# Patient Record
Sex: MALE | Race: WHITE | NOT HISPANIC OR LATINO | Employment: OTHER | URBAN - METROPOLITAN AREA
[De-identification: names, ages, dates, MRNs, and addresses within clinical notes are randomized per-mention and may not be internally consistent; named-entity substitution may affect disease eponyms.]

---

## 2019-05-27 PROBLEM — M17.12 OSTEOARTHRITIS OF LEFT KNEE: Status: ACTIVE | Noted: 2019-05-27

## 2019-06-06 PROBLEM — E11.9 TYPE 2 DIABETES MELLITUS, WITHOUT LONG-TERM CURRENT USE OF INSULIN: Status: ACTIVE | Noted: 2019-06-06

## 2019-06-06 PROBLEM — M17.12 PRIMARY OSTEOARTHRITIS OF LEFT KNEE: Status: ACTIVE | Noted: 2019-06-06

## 2019-06-06 PROBLEM — I10 HYPERTENSION: Status: ACTIVE | Noted: 2019-06-06

## 2019-10-07 ENCOUNTER — OFFICE VISIT (OUTPATIENT)
Dept: NEUROLOGY | Facility: CLINIC | Age: 84
End: 2019-10-07
Payer: MEDICARE

## 2019-10-07 ENCOUNTER — LAB VISIT (OUTPATIENT)
Dept: LAB | Facility: HOSPITAL | Age: 84
End: 2019-10-07
Attending: PSYCHIATRY & NEUROLOGY
Payer: MEDICARE

## 2019-10-07 VITALS
BODY MASS INDEX: 29.54 KG/M2 | DIASTOLIC BLOOD PRESSURE: 70 MMHG | WEIGHT: 194.88 LBS | SYSTOLIC BLOOD PRESSURE: 128 MMHG | HEIGHT: 68 IN | HEART RATE: 100 BPM | RESPIRATION RATE: 14 BRPM

## 2019-10-07 DIAGNOSIS — R20.0 NUMBNESS AND TINGLING OF BOTH FEET: ICD-10-CM

## 2019-10-07 DIAGNOSIS — R20.2 NUMBNESS AND TINGLING OF BOTH FEET: ICD-10-CM

## 2019-10-07 DIAGNOSIS — R20.0 NUMBNESS AND TINGLING OF BOTH FEET: Primary | ICD-10-CM

## 2019-10-07 DIAGNOSIS — R20.2 NUMBNESS AND TINGLING OF BOTH FEET: Primary | ICD-10-CM

## 2019-10-07 LAB
TSH SERPL DL<=0.005 MIU/L-ACNC: 2.32 UIU/ML (ref 0.4–4)
VIT B12 SERPL-MCNC: 278 PG/ML (ref 210–950)

## 2019-10-07 PROCEDURE — 84165 PATHOLOGIST INTERPRETATION SPE: ICD-10-PCS | Mod: 26,,, | Performed by: PATHOLOGY

## 2019-10-07 PROCEDURE — 84165 PROTEIN E-PHORESIS SERUM: CPT

## 2019-10-07 PROCEDURE — 84165 PROTEIN E-PHORESIS SERUM: CPT | Mod: 26,,, | Performed by: PATHOLOGY

## 2019-10-07 PROCEDURE — 99204 OFFICE O/P NEW MOD 45 MIN: CPT | Mod: S$GLB,,, | Performed by: PSYCHIATRY & NEUROLOGY

## 2019-10-07 PROCEDURE — 99204 PR OFFICE/OUTPT VISIT, NEW, LEVL IV, 45-59 MIN: ICD-10-PCS | Mod: S$GLB,,, | Performed by: PSYCHIATRY & NEUROLOGY

## 2019-10-07 PROCEDURE — 99999 PR PBB SHADOW E&M-NEW PATIENT-LVL III: ICD-10-PCS | Mod: PBBFAC,,, | Performed by: PSYCHIATRY & NEUROLOGY

## 2019-10-07 PROCEDURE — 86334 IMMUNOFIX E-PHORESIS SERUM: CPT

## 2019-10-07 PROCEDURE — 84443 ASSAY THYROID STIM HORMONE: CPT

## 2019-10-07 PROCEDURE — 86334 IMMUNOFIX E-PHORESIS SERUM: CPT | Mod: 26,,, | Performed by: PATHOLOGY

## 2019-10-07 PROCEDURE — 86334 PATHOLOGIST INTERPRETATION IFE: ICD-10-PCS | Mod: 26,,, | Performed by: PATHOLOGY

## 2019-10-07 PROCEDURE — 99999 PR PBB SHADOW E&M-NEW PATIENT-LVL III: CPT | Mod: PBBFAC,,, | Performed by: PSYCHIATRY & NEUROLOGY

## 2019-10-07 PROCEDURE — 36415 COLL VENOUS BLD VENIPUNCTURE: CPT

## 2019-10-07 PROCEDURE — 82607 VITAMIN B-12: CPT

## 2019-10-07 RX ORDER — IRBESARTAN 300 MG/1
300 TABLET ORAL DAILY
Refills: 11 | COMMUNITY
Start: 2019-09-08 | End: 2020-08-25

## 2019-10-07 RX ORDER — GABAPENTIN 400 MG/1
400 CAPSULE ORAL 3 TIMES DAILY
Qty: 90 CAPSULE | Refills: 11 | Status: SHIPPED | OUTPATIENT
Start: 2019-10-07 | End: 2020-08-25 | Stop reason: SDUPTHER

## 2019-10-07 RX ORDER — HYDROCHLOROTHIAZIDE 25 MG/1
25 TABLET ORAL DAILY
Refills: 11 | COMMUNITY
Start: 2019-08-27

## 2019-10-07 RX ORDER — GABAPENTIN 300 MG/1
300 CAPSULE ORAL 3 TIMES DAILY
Refills: 3 | COMMUNITY
Start: 2019-09-23 | End: 2019-10-07

## 2019-10-07 RX ORDER — GABAPENTIN 100 MG/1
CAPSULE ORAL
Refills: 0 | COMMUNITY
Start: 2019-07-02 | End: 2019-10-07 | Stop reason: DRUGHIGH

## 2019-10-07 NOTE — PROGRESS NOTES
Consult from ANNA MARIE Rooney    HPI: Hima Leija is a 86 y.o. male with history of numbness in the legs for several years.     He is diabetic (diagnosed several years ago and was not on medication for sometime)and has been having numbness and burning pain in the feet. He feels this is uncomfortable, especially in the am   PCP placed him on Gabapentin 300mg TID in the past month and this helped at first, but is not consistently helping how.  No side effect  He does feel some distal weakness.  His glucose has improving over time.   He has a prior history of lower back surgery and has some chronic non-radicular back pain.     He is very active with yard work    He lives with family. 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 He is a retired fisherman    Review of Systems   Constitutional: Negative for fever.   HENT: Negative for nosebleeds.    Eyes: Negative for double vision.   Respiratory: Negative for hemoptysis.    Cardiovascular: Negative for leg swelling.   Gastrointestinal: Negative for blood in stool.   Genitourinary: Negative for hematuria.   Musculoskeletal: Negative for falls.   Skin: Negative for rash.   Neurological: Positive for sensory change.   Endo/Heme/Allergies: Negative for environmental allergies.         I have reviewed all of this patient's past medical and surgical histories as well as family and social histories and active allergies and medications as documented in the electronic medical record.        Exam:  Gen Appearance, well developed/nourished in no apparent distress  CV: 2+ distal pulses with no edema or swelling  Neuro:  MS: Awake, alert, oriented to place, person, time, situation. Sustains attention. Recent/remote memory intact, Language is full to spontaneous speech/repetition/naming/comprehension. Fund of Knowledge is full  CN: Optic discs are flat with normal vasculature, PERRL, Extraoccular movements and visual fields are full. Normal facial sensation and strength, Hearing symmetric,  Tongue and Palate are midline and strong. Shoulder Shrug symmetric and strong.  Motor: Normal bulk, tone, no abnormal movements. 5/5 strength bilateral upper/lower extremities with 2+ reflexes in the legs and 1+ in the arms and bilateral plantar response  Sensory: symmetric to light touch, pain, temp, and vibration- but reduced in the feet to vibration and pin.  Romberg negative  Cerebellar: Finger-nose,Heal-shin, Rapid alternating movements intact  Gait: Normal stance, no ataxia but mildly bent posture noted (history of remote lumbar surgery)      Labs: 6/2019 A1C 8, CMP, CBC reviewed      Assessment/Plan: Hima Leija is a 86 y.o. male with DM2 and several years of neuropathic pain in the feet suggestive of Neuropathy  I recommend:   1. Note his DM2, poorly controlled previously now improving per him. The importance of good DM control and checking feet daily for sores reviewed  2. EMG/NCS of the legs to confirm neuropathy  3. Labs: B12, TSh, SPEP, BHAVIN  4. Gabapentin has been starting to help: raise dose to 400mg TID Unless sedation, mood changes or other side effects    RTC EMG/NCS  CC: ANNA MARIE Rooney

## 2019-10-07 NOTE — LETTER
October 7, 2019      LONI Wilson  [address]  [address]  [place]  [place] [address]           [facility]  [address] [facility] [address]  Phone: [phone]  Fax: [phone]          Patient: Hima Leija   MR Number: [id number]   YOB: 1933   Date of Visit: 10/7/2019       Dear Darci Rooney:    Thank you for referring Hima Leija to me for evaluation. Attached you will find relevant portions of my assessment and plan of care.    If you have questions, please do not hesitate to call me. I look forward to following Hima Leija along with you.    Sincerely,    Momo Rodgers MD    Enclosure  CC:  No Recipients    If you would like to receive this communication electronically, please contact [email or web address] or [phone] to request more information on [employer] access.    For providers and/or their staff who would like to refer a patient to [facility], please contact us through our one-stop-shop provider referral line, [facility], at [phone].    If you feel you have received this communication in error or would no longer like to receive these types of communications, please e-mail [email or web address]

## 2019-10-08 LAB
ALBUMIN SERPL ELPH-MCNC: 3.85 G/DL (ref 3.35–5.55)
ALPHA1 GLOB SERPL ELPH-MCNC: 0.39 G/DL (ref 0.17–0.41)
ALPHA2 GLOB SERPL ELPH-MCNC: 1.03 G/DL (ref 0.43–0.99)
B-GLOBULIN SERPL ELPH-MCNC: 0.82 G/DL (ref 0.5–1.1)
GAMMA GLOB SERPL ELPH-MCNC: 1.2 G/DL (ref 0.67–1.58)
INTERPRETATION SERPL IFE-IMP: NORMAL
PATHOLOGIST INTERPRETATION IFE: NORMAL
PATHOLOGIST INTERPRETATION SPE: NORMAL
PROT SERPL-MCNC: 7.3 G/DL (ref 6–8.4)

## 2019-10-11 ENCOUNTER — HOSPITAL ENCOUNTER (EMERGENCY)
Facility: HOSPITAL | Age: 84
Discharge: HOME | End: 2019-10-11
Attending: SURGERY
Payer: MEDICARE

## 2019-10-11 VITALS
BODY MASS INDEX: 30.03 KG/M2 | TEMPERATURE: 97 F | WEIGHT: 197.56 LBS | DIASTOLIC BLOOD PRESSURE: 87 MMHG | RESPIRATION RATE: 20 BRPM | SYSTOLIC BLOOD PRESSURE: 176 MMHG | HEART RATE: 94 BPM

## 2019-10-11 DIAGNOSIS — M25.531 BILATERAL WRIST PAIN: Primary | ICD-10-CM

## 2019-10-11 DIAGNOSIS — M11.839 CALCIUM PYROPHOSPHATE DEPOSITION DISEASE OF WRIST: ICD-10-CM

## 2019-10-11 DIAGNOSIS — M25.532 BILATERAL WRIST PAIN: Primary | ICD-10-CM

## 2019-10-11 LAB
ALBUMIN SERPL BCP-MCNC: 3.3 G/DL (ref 3.5–5.2)
ALP SERPL-CCNC: 94 U/L (ref 55–135)
ALT SERPL W/O P-5'-P-CCNC: 8 U/L (ref 10–44)
ANION GAP SERPL CALC-SCNC: 12 MMOL/L (ref 8–16)
AST SERPL-CCNC: 10 U/L (ref 10–40)
BASOPHILS # BLD AUTO: 0.02 K/UL (ref 0–0.2)
BASOPHILS NFR BLD: 0.4 % (ref 0–1.9)
BILIRUB SERPL-MCNC: 0.7 MG/DL (ref 0.1–1)
BUN SERPL-MCNC: 19 MG/DL (ref 8–23)
CALCIUM SERPL-MCNC: 9.1 MG/DL (ref 8.7–10.5)
CHLORIDE SERPL-SCNC: 101 MMOL/L (ref 95–110)
CO2 SERPL-SCNC: 24 MMOL/L (ref 23–29)
CREAT SERPL-MCNC: 0.9 MG/DL (ref 0.5–1.4)
DIFFERENTIAL METHOD: ABNORMAL
EOSINOPHIL # BLD AUTO: 0.1 K/UL (ref 0–0.5)
EOSINOPHIL NFR BLD: 1.1 % (ref 0–8)
ERYTHROCYTE [DISTWIDTH] IN BLOOD BY AUTOMATED COUNT: 13.2 % (ref 11.5–14.5)
EST. GFR  (AFRICAN AMERICAN): >60 ML/MIN/1.73 M^2
EST. GFR  (NON AFRICAN AMERICAN): >60 ML/MIN/1.73 M^2
GLUCOSE SERPL-MCNC: 153 MG/DL (ref 70–110)
HCT VFR BLD AUTO: 36 % (ref 40–54)
HGB BLD-MCNC: 12.1 G/DL (ref 14–18)
IMM GRANULOCYTES # BLD AUTO: 0.02 K/UL (ref 0–0.04)
IMM GRANULOCYTES NFR BLD AUTO: 0.4 % (ref 0–0.5)
LYMPHOCYTES # BLD AUTO: 1.3 K/UL (ref 1–4.8)
LYMPHOCYTES NFR BLD: 24.6 % (ref 18–48)
MCH RBC QN AUTO: 30.4 PG (ref 27–31)
MCHC RBC AUTO-ENTMCNC: 33.6 G/DL (ref 32–36)
MCV RBC AUTO: 91 FL (ref 82–98)
MONOCYTES # BLD AUTO: 0.5 K/UL (ref 0.3–1)
MONOCYTES NFR BLD: 8.9 % (ref 4–15)
NEUTROPHILS # BLD AUTO: 3.5 K/UL (ref 1.8–7.7)
NEUTROPHILS NFR BLD: 64.6 % (ref 38–73)
NRBC BLD-RTO: 0 /100 WBC
PLATELET # BLD AUTO: 215 K/UL (ref 150–350)
PMV BLD AUTO: 8.8 FL (ref 9.2–12.9)
POTASSIUM SERPL-SCNC: 3.9 MMOL/L (ref 3.5–5.1)
PROT SERPL-MCNC: 7.2 G/DL (ref 6–8.4)
RBC # BLD AUTO: 3.98 M/UL (ref 4.6–6.2)
SODIUM SERPL-SCNC: 137 MMOL/L (ref 136–145)
URATE SERPL-MCNC: 3.6 MG/DL (ref 3.4–7)
WBC # BLD AUTO: 5.4 K/UL (ref 3.9–12.7)

## 2019-10-11 PROCEDURE — 99284 EMERGENCY DEPT VISIT MOD MDM: CPT | Mod: 25

## 2019-10-11 PROCEDURE — 63600175 PHARM REV CODE 636 W HCPCS: Performed by: SURGERY

## 2019-10-11 PROCEDURE — 80053 COMPREHEN METABOLIC PANEL: CPT

## 2019-10-11 PROCEDURE — 36415 COLL VENOUS BLD VENIPUNCTURE: CPT

## 2019-10-11 PROCEDURE — 25000003 PHARM REV CODE 250: Performed by: NURSE PRACTITIONER

## 2019-10-11 PROCEDURE — 85025 COMPLETE CBC W/AUTO DIFF WBC: CPT

## 2019-10-11 PROCEDURE — 84550 ASSAY OF BLOOD/URIC ACID: CPT

## 2019-10-11 PROCEDURE — 96372 THER/PROPH/DIAG INJ SC/IM: CPT

## 2019-10-11 RX ORDER — MELOXICAM 7.5 MG/1
7.5 TABLET ORAL DAILY
Qty: 7 TABLET | Refills: 0 | Status: SHIPPED | OUTPATIENT
Start: 2019-10-11 | End: 2019-10-18

## 2019-10-11 RX ORDER — CYCLOBENZAPRINE HCL 5 MG
5 TABLET ORAL NIGHTLY PRN
Qty: 10 TABLET | Refills: 0 | Status: SHIPPED | OUTPATIENT
Start: 2019-10-11 | End: 2019-10-21

## 2019-10-11 RX ORDER — MORPHINE SULFATE 2 MG/ML
1 INJECTION, SOLUTION INTRAMUSCULAR; INTRAVENOUS
Status: COMPLETED | OUTPATIENT
Start: 2019-10-11 | End: 2019-10-11

## 2019-10-11 RX ORDER — TRAMADOL HYDROCHLORIDE 50 MG/1
50 TABLET ORAL
Status: COMPLETED | OUTPATIENT
Start: 2019-10-11 | End: 2019-10-11

## 2019-10-11 RX ORDER — ONDANSETRON 2 MG/ML
4 INJECTION INTRAMUSCULAR; INTRAVENOUS
Status: COMPLETED | OUTPATIENT
Start: 2019-10-11 | End: 2019-10-11

## 2019-10-11 RX ADMIN — TRAMADOL HYDROCHLORIDE 50 MG: 50 TABLET, FILM COATED ORAL at 06:10

## 2019-10-11 RX ADMIN — MORPHINE SULFATE 1 MG: 2 INJECTION, SOLUTION INTRAMUSCULAR; INTRAVENOUS at 06:10

## 2019-10-11 RX ADMIN — ONDANSETRON 4 MG: 2 INJECTION INTRAMUSCULAR; INTRAVENOUS at 06:10

## 2019-10-11 NOTE — ED PROVIDER NOTES
Encounter Date: 10/11/2019       History     Chief Complaint   Patient presents with    Hand Pain     bilateral hands     Hima Leija is a 86 y.o. male with PMH of CAD, hyperlipidemia, hypertension, PAD, osteoarthritis, DM type 2 who presents to the ED with reports of bilateral wrist pain with associated swelling. Patient reports a 3 day history of acute onset of pain to bilateral wrist.  Pain is described as throbbing, currently rated 5/10 on pain scale.  He reports associated swelling.  He reports decreased range of motion to bilateral hands, difficulty grasping.  He does not endorse fever, chills, or body aches. He denies trauma or history of gout.     The history is provided by the patient.     Review of patient's allergies indicates:  No Known Allergies  Past Medical History:   Diagnosis Date    Bleeding from the nose     R/T ASPIRIN USE    Coronary artery disease     Diabetes mellitus     [place] (hard of hearing)     Hyperlipidemia     Hypertension     Osteoarthritis     PAD (peripheral artery disease)     Wears dentures     UPPER      Past Surgical History:   Procedure Laterality Date    BACK SURGERY      CATARACT EXTRACTION, BILATERAL      COLONOSCOPY      CORONARY ARTERY BYPASS GRAFT      JOINT REPLACEMENT Left 2019    knee    TOTAL KNEE ARTHROPLASTY Left 2019    Procedure: ARTHROPLASTY, KNEE, TOTAL, left;  Surgeon: ISABEL Gaitan II, MD;  Location: [facility] OR;  Service: Orthopedics;  Laterality: Left;     Family History   Problem Relation Age of Onset    Hypertension Mother     Heart disease Mother     Diabetes Mother      Social History     Tobacco Use    Smoking status: Former Smoker     Types: Cigarettes     Last attempt to quit: 1966     Years since quittin.8    Smokeless tobacco: Never Used   Substance Use Topics    Alcohol use: Yes     Alcohol/week: 1.0 standard drinks     Types: 1 Glasses of wine per week     Comment: WEEKLY    Drug use: Never     Review of  Systems   Constitutional: Negative.  Negative for appetite change, chills and fever.   HENT: Negative.  Negative for congestion, ear discharge, ear pain, postnasal drip, rhinorrhea and sore throat.    Eyes: Negative.    Respiratory: Negative.  Negative for cough, chest tightness and shortness of breath.    Cardiovascular: Negative.  Negative for chest pain.   Gastrointestinal: Negative.  Negative for abdominal distention, abdominal pain and nausea.   Endocrine: Negative.    Genitourinary: Negative.  Negative for dysuria, flank pain, hematuria and urgency.   Musculoskeletal: Positive for arthralgias (Bilateral wrists with decreased range of motion.) and joint swelling (Bilateral wrists). Negative for back pain.   Skin: Negative.  Negative for rash.   Allergic/Immunologic: Negative.    Neurological: Negative.  Negative for dizziness, weakness, numbness and headaches.   Hematological: Negative.  Does not bruise/bleed easily.   Psychiatric/Behavioral: Negative.        Physical Exam     Initial Vitals [10/11/19 1635]   BP Pulse Resp Temp SpO2   (!) 182/93 99 20 97.4 °F (36.3 °C) --      MAP       --         Physical Exam    Nursing note and vitals reviewed.  Constitutional: He appears well-developed and well-nourished.   HENT:   Head: Normocephalic and atraumatic.   Right Ear: External ear normal.   Left Ear: External ear normal.   Mouth/Throat: Oropharynx is clear and moist.   Eyes: Conjunctivae and EOM are normal. Pupils are equal, round, and reactive to light.   Neck: Neck supple.   Cardiovascular: Normal rate, regular rhythm, normal heart sounds and intact distal pulses.   Pulmonary/Chest: Effort normal and breath sounds normal.   Abdominal: Soft. Bowel sounds are normal. There is no tenderness.   Musculoskeletal:        Right wrist: He exhibits decreased range of motion, tenderness and swelling (Joint swelling with tenderness with palpation.  No erythema or warmth.  No sensory deficits noted. Right hand warm and  dry, capillary refill less than 2 sec.).        Left wrist: He exhibits decreased range of motion, tenderness and swelling (Joint swelling with tenderness with palpation.  Decreased range of motion due to pain. No sensory deficits noted. Capillary refill less than 2 sec.).   Neurological: He is alert and oriented to person, place, and time. He has normal strength and normal reflexes. He displays no atrophy and no tremor. No cranial nerve deficit or sensory deficit. He exhibits normal muscle tone. He displays no seizure activity. GCS eye subscore is 4. GCS verbal subscore is 5. GCS motor subscore is 6.   Skin: Skin is warm and dry.   Psychiatric: He has a normal mood and affect. His behavior is normal. Judgment and thought content normal.         ED Course   Procedures  Labs Reviewed   CBC W/ AUTO DIFFERENTIAL - Abnormal; Notable for the following components:       Result Value    RBC 3.98 (*)     Hemoglobin 12.1 (*)     Hematocrit 36.0 (*)     MPV 8.8 (*)     All other components within normal limits   COMPREHENSIVE METABOLIC PANEL - Abnormal; Notable for the following components:    Glucose 153 (*)     Albumin 3.3 (*)     ALT 8 (*)     All other components within normal limits   URIC ACID          Imaging Results          X-Ray Wrist Complete Right (Final result)  Result time 10/11/19 17:48:50    Final result by Anatoliy Sheehan MD (10/11/19 17:48:50)                 Impression:      No acute fracture or dislocation.      Electronically signed by: Anatoliy Sheehan MD  Date:    10/11/2019  Time:    17:48             Narrative:    EXAMINATION:  XR WRIST COMPLETE 3 VIEWS RIGHT    CLINICAL HISTORY:  XR WRIST COMPLETE 3 VIEWS RIGHTPain in right wrist    COMPARISON:  None    FINDINGS:  Three views of the right wrist were obtained.    No evidence of acute fracture or dislocation.  Diffuse osteopenia and moderate soft tissue swelling.  Triangular fibrocartilage calcification can be seen with CPPD.  Osteoarthritic changes are  seen at the 1st carpometacarpal joint and radiocarpal joint which is moderate.                               X-Ray Wrist Complete Left (Final result)  Result time 10/11/19 17:47:43    Final result by Anatoliy Sheehan MD (10/11/19 17:47:43)                 Impression:      Question nondisplaced fracture involving the proximal 3rd of the scaphoid bone. Correlate with point tenderness within the anatomic snuffbox.      Electronically signed by: Anatoliy Sheehan MD  Date:    10/11/2019  Time:    17:47             Narrative:    EXAMINATION:  XR WRIST COMPLETE 3 VIEWS LEFT    CLINICAL HISTORY:  XR WRIST COMPLETE 3 VIEWS LEFTPain in left wrist    COMPARISON:  None    FINDINGS:  Three views of the left wrist were obtained.    Question nondisplaced fracture involving the proximal 3rd of the scaphoid bone.  Correlate with point tenderness within the anatomic snuffbox..  Mild osteopenia.  Diffuse soft tissue swelling and vascular calcifications.  Chondrocalcinosis is seen within the triangular fibrocartilage which can be seen with CPPD.  Moderate degenerative changes are seen at the radiocarpal and 1st carpometacarpal joint.                                  Medications   traMADol tablet 50 mg (50 mg Oral Given 10/11/19 1805)   morphine injection 1 mg (1 mg Intramuscular Given 10/11/19 1837)   ondansetron injection 4 mg (4 mg Intramuscular Given 10/11/19 1837)                          Clinical Impression:       ICD-10-CM ICD-9-CM   1. Bilateral wrist pain M25.531 719.43    M25.532    2. 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    MELOXICAM (MOBIC) 7.5 MG TABLET    Take 1 tablet (7.5 mg total) by mouth once daily. for 7 days      The patient acknowledges that close follow up with medical  provider is required. Instructed to follow up with PCP within 2 days. Patient was given specific return precautions. The patient agrees to comply with all instruction and directions given in the ER.                  Princess Vogel NP  10/11/19 1930

## 2019-10-11 NOTE — ED TRIAGE NOTES
86 y.o. male presents to ER   Chief Complaint   Patient presents with    Hand Pain     bilateral hands   pt c/o bilateral hand swelling, pain, & itching x's 3 days, reports hx of gout. No acute distress noted. No acute distress noted.

## 2019-10-11 NOTE — ED NOTES
The patient is awake, alert, family member at bedside. Normal respiratory effort and rate noted, full ROM in all extremities. Bed in low, locked position. Pt able to change position independently. Will continue to monitor.

## 2019-10-11 NOTE — ED NOTES
The patient is awake, alert, aware of environment. Airway is open and patent, respirations are spontaneous, normal respiratory effort and rate noted, full ROM in all extremities, resting comfortably. No change from previous assessment. Bed in low, locked position. Will continue to monitor.

## 2019-10-31 ENCOUNTER — PROCEDURE VISIT (OUTPATIENT)
Dept: NEUROLOGY | Facility: CLINIC | Age: 84
End: 2019-10-31
Payer: MEDICARE

## 2019-10-31 DIAGNOSIS — R20.0 NUMBNESS AND TINGLING OF BOTH FEET: ICD-10-CM

## 2019-10-31 DIAGNOSIS — R20.2 NUMBNESS AND TINGLING OF BOTH FEET: ICD-10-CM

## 2019-10-31 DIAGNOSIS — E11.42 DIABETIC POLYNEUROPATHY ASSOCIATED WITH TYPE 2 DIABETES MELLITUS: Primary | ICD-10-CM

## 2019-10-31 PROCEDURE — 95886 MUSC TEST DONE W/N TEST COMP: CPT | Mod: S$GLB,,, | Performed by: PSYCHIATRY & NEUROLOGY

## 2019-10-31 PROCEDURE — 95910 NRV CNDJ TEST 7-8 STUDIES: CPT | Mod: S$GLB,,, | Performed by: PSYCHIATRY & NEUROLOGY

## 2019-10-31 PROCEDURE — 95886 PR EMG COMPLETE, W/ NERVE CONDUCTION STUDIES, 5+ MUSCLES: ICD-10-PCS | Mod: S$GLB,,, | Performed by: PSYCHIATRY & NEUROLOGY

## 2019-10-31 PROCEDURE — 95910 PR NERVE CONDUCTION STUDY; 7-8 STUDIES: ICD-10-PCS | Mod: S$GLB,,, | Performed by: PSYCHIATRY & NEUROLOGY

## 2019-10-31 NOTE — PROCEDURES
EMG W/ ULTRASOUND AND NERVE CONDUCTION TEST 2 Extremities  Date/Time: 10/31/2019 3:00 PM  Performed by: Momo Rodgers MD  Authorized by: Momo Rodgers MD       REPORT OF EMG and NERVE CONDUCTION STUDY    Name: Hima Leija  Date of Study:  10/31/19  Referring Physician:  Vishal  Test Performed by:  MD Vishal  Full Values Attached  Informed Consent Scanned.   No anesthesia used.   Amount of Blood Loss: none. The patient tolerated this procedure well.       Informed consent was obtained prior to performing this study. Two patient identifiers were confirmed with the patient prior to performing this study. A time out to determine correct patient and and agreement on procedure performed was conducted prior to the concentric needle examination.    Reason for the study:  Numb feet      Findings:   Nerve conduction studies of the bilateral peroneal motor, bilateral tibial motor, bilateral sural nerves and bilateral H-reflexes were performed.   Amplitudes were mildly reduced without conduction block. However, distal latencies, conduction velocities, and F-waves were normal. H reflexes were absent  EMG of selected muscles of the bilateral legs  were performed as indicated on the attached sheets.  Insertional activity was normal without fasciculation or fibrillation, normal sized and phasia of motor units.      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 [facility].     Recommendations (discusses at this visit):  -B12 low normal reviewed. Take 1000mcg B12 nightly  -Gabapentin 400mg TID is helpful. Maintain good DM2 control to prevent worsening neuropathy reviewed.     RTC 6 months

## 2020-08-25 ENCOUNTER — OFFICE VISIT (OUTPATIENT)
Dept: NEUROLOGY | Facility: CLINIC | Age: 85
End: 2020-08-25
Payer: MEDICARE

## 2020-08-25 VITALS
DIASTOLIC BLOOD PRESSURE: 78 MMHG | WEIGHT: 198.44 LBS | BODY MASS INDEX: 30.07 KG/M2 | HEIGHT: 68 IN | OXYGEN SATURATION: 98 % | HEART RATE: 75 BPM | RESPIRATION RATE: 16 BRPM | TEMPERATURE: 98 F | SYSTOLIC BLOOD PRESSURE: 110 MMHG

## 2020-08-25 DIAGNOSIS — R20.2 NUMBNESS AND TINGLING OF BOTH FEET: Primary | ICD-10-CM

## 2020-08-25 DIAGNOSIS — E66.9 OBESITY (BMI 30.0-34.9): ICD-10-CM

## 2020-08-25 DIAGNOSIS — E11.42 DIABETIC POLYNEUROPATHY ASSOCIATED WITH TYPE 2 DIABETES MELLITUS: ICD-10-CM

## 2020-08-25 DIAGNOSIS — R20.0 NUMBNESS AND TINGLING OF BOTH FEET: Primary | ICD-10-CM

## 2020-08-25 PROCEDURE — 99214 PR OFFICE/OUTPT VISIT, EST, LEVL IV, 30-39 MIN: ICD-10-PCS | Mod: S$GLB,,, | Performed by: PSYCHIATRY & NEUROLOGY

## 2020-08-25 PROCEDURE — 99999 PR PBB SHADOW E&M-EST. PATIENT-LVL IV: ICD-10-PCS | Mod: PBBFAC,,, | Performed by: PSYCHIATRY & NEUROLOGY

## 2020-08-25 PROCEDURE — 1101F PT FALLS ASSESS-DOCD LE1/YR: CPT | Mod: CPTII,S$GLB,, | Performed by: PSYCHIATRY & NEUROLOGY

## 2020-08-25 PROCEDURE — 1125F AMNT PAIN NOTED PAIN PRSNT: CPT | Mod: S$GLB,,, | Performed by: PSYCHIATRY & NEUROLOGY

## 2020-08-25 PROCEDURE — 1125F PR PAIN SEVERITY QUANTIFIED, PAIN PRESENT: ICD-10-PCS | Mod: S$GLB,,, | Performed by: PSYCHIATRY & NEUROLOGY

## 2020-08-25 PROCEDURE — 1101F PR PT FALLS ASSESS DOC 0-1 FALLS W/OUT INJ PAST YR: ICD-10-PCS | Mod: CPTII,S$GLB,, | Performed by: PSYCHIATRY & NEUROLOGY

## 2020-08-25 PROCEDURE — 99214 OFFICE O/P EST MOD 30 MIN: CPT | Mod: S$GLB,,, | Performed by: PSYCHIATRY & NEUROLOGY

## 2020-08-25 PROCEDURE — 1159F MED LIST DOCD IN RCRD: CPT | Mod: S$GLB,,, | Performed by: PSYCHIATRY & NEUROLOGY

## 2020-08-25 PROCEDURE — 1159F PR MEDICATION LIST DOCUMENTED IN MEDICAL RECORD: ICD-10-PCS | Mod: S$GLB,,, | Performed by: PSYCHIATRY & NEUROLOGY

## 2020-08-25 PROCEDURE — 99999 PR PBB SHADOW E&M-EST. PATIENT-LVL IV: CPT | Mod: PBBFAC,,, | Performed by: PSYCHIATRY & NEUROLOGY

## 2020-08-25 RX ORDER — LANOLIN ALCOHOL/MO/W.PET/CERES
1000 CREAM (GRAM) TOPICAL DAILY
COMMUNITY

## 2020-08-25 RX ORDER — MELOXICAM 7.5 MG/1
7.5 TABLET ORAL 2 TIMES DAILY PRN
COMMUNITY
Start: 2020-08-10 | End: 2022-08-23

## 2020-08-25 RX ORDER — GABAPENTIN 400 MG/1
400 CAPSULE ORAL 3 TIMES DAILY
Qty: 270 CAPSULE | Refills: 3 | Status: SHIPPED | OUTPATIENT
Start: 2020-08-25 | End: 2021-09-22

## 2020-08-25 NOTE — PROGRESS NOTES
Consult from ANNA MARIE Rooney    HPI: Hima Leija is a 87 y.o. male with history of numbness in the legs for several years.     Patient is here for routine follow up    Still taking B12   Neuropathy pain is currently well controlled, tolerable burning at times.    A1C is pending with PCP. Reports good control of DM2 overall since last visit.   Weight up a few pounds since the last visit    He lives with family. 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 He is a retired fisherman    Review of Systems   Constitutional: Negative for fever.   HENT: Negative for nosebleeds.    Eyes: Negative for double vision.   Respiratory: Negative for hemoptysis.    Cardiovascular: Negative for leg swelling.   Gastrointestinal: Negative for blood in stool.   Genitourinary: Negative for hematuria.   Musculoskeletal: Negative for falls.   Skin: Negative for rash.   Neurological: Positive for sensory change.   Endo/Heme/Allergies: Negative for environmental allergies.   Psychiatric/Behavioral: Negative for memory loss.         I have reviewed all of this patient's past medical and surgical histories as well as family and social histories and active allergies and medications as documented in the electronic medical record.        Exam:  Gen Appearance, well developed/nourished in no apparent distress  CV: 2+ distal pulses with no edema or swelling  Neuro:  MS: Awake, alert, . Sustains attention. Recent/remote memory intact, Language is full to spontaneous speech/repetition/naming/comprehension. Fund of Knowledge is full  CN: Optic discs are flat with normal vasculature, PERRL, Extraoccular movements and visual fields are full. Normal facial sensation and strength, Hearing symmetric, Tongue and Palate are midline and strong. Shoulder Shrug symmetric and strong.  Motor: Normal bulk, tone, no abnormal movements. 5/5 strength bilateral upper/lower extremities with 2+ reflexes in the legs and 1+ in the arms and no clonus  Sensory: symmetric to light  touch, pain, temp, and vibration- but reduced in the feet to vibration and pin.  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 male with DM2 and several years of neuropathic pain in the feet suggestive of Neuropathy. 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 Note his DM2, poorly controlled previously now improving per him. The importance of good DM control and checking feet daily for sores reviewed prior. Goal A1C less than 7  -B12 low normal.  Taking 1000mcg B12 nightly- follow up B12 level at the next visit  -Gabapentin 400mg TID is helpful- continue  -reduced obesity through diet, exercise as tolerated reviewed  RTC 1 year

## 2022-02-14 ENCOUNTER — LAB VISIT (OUTPATIENT)
Dept: LAB | Facility: HOSPITAL | Age: 87
End: 2022-02-14
Attending: PSYCHIATRY & NEUROLOGY
Payer: MEDICARE

## 2022-02-14 ENCOUNTER — OFFICE VISIT (OUTPATIENT)
Dept: NEUROLOGY | Facility: CLINIC | Age: 87
End: 2022-02-14
Payer: MEDICARE

## 2022-02-14 VITALS
HEART RATE: 80 BPM | BODY MASS INDEX: 28.53 KG/M2 | HEIGHT: 68 IN | SYSTOLIC BLOOD PRESSURE: 144 MMHG | WEIGHT: 188.25 LBS | RESPIRATION RATE: 14 BRPM | DIASTOLIC BLOOD PRESSURE: 76 MMHG

## 2022-02-14 DIAGNOSIS — E53.8 B12 DEFICIENCY: ICD-10-CM

## 2022-02-14 DIAGNOSIS — E66.3 OVERWEIGHT: ICD-10-CM

## 2022-02-14 DIAGNOSIS — E11.42 DIABETIC POLYNEUROPATHY ASSOCIATED WITH TYPE 2 DIABETES MELLITUS: Primary | ICD-10-CM

## 2022-02-14 LAB — VIT B12 SERPL-MCNC: 339 PG/ML (ref 210–950)

## 2022-02-14 PROCEDURE — 99214 PR OFFICE/OUTPT VISIT, EST, LEVL IV, 30-39 MIN: ICD-10-PCS | Mod: S$GLB,,, | Performed by: PSYCHIATRY & NEUROLOGY

## 2022-02-14 PROCEDURE — 1159F MED LIST DOCD IN RCRD: CPT | Mod: CPTII,S$GLB,, | Performed by: PSYCHIATRY & NEUROLOGY

## 2022-02-14 PROCEDURE — 82607 VITAMIN B-12: CPT | Performed by: PSYCHIATRY & NEUROLOGY

## 2022-02-14 PROCEDURE — 99214 OFFICE O/P EST MOD 30 MIN: CPT | Mod: S$GLB,,, | Performed by: PSYCHIATRY & NEUROLOGY

## 2022-02-14 PROCEDURE — 1159F PR MEDICATION LIST DOCUMENTED IN MEDICAL RECORD: ICD-10-PCS | Mod: CPTII,S$GLB,, | Performed by: PSYCHIATRY & NEUROLOGY

## 2022-02-14 PROCEDURE — 99999 PR PBB SHADOW E&M-EST. PATIENT-LVL III: ICD-10-PCS | Mod: PBBFAC,,, | Performed by: PSYCHIATRY & NEUROLOGY

## 2022-02-14 PROCEDURE — 99999 PR PBB SHADOW E&M-EST. PATIENT-LVL III: CPT | Mod: PBBFAC,,, | Performed by: PSYCHIATRY & NEUROLOGY

## 2022-02-14 PROCEDURE — 1126F AMNT PAIN NOTED NONE PRSNT: CPT | Mod: CPTII,S$GLB,, | Performed by: PSYCHIATRY & NEUROLOGY

## 2022-02-14 PROCEDURE — 36415 COLL VENOUS BLD VENIPUNCTURE: CPT | Performed by: PSYCHIATRY & NEUROLOGY

## 2022-02-14 PROCEDURE — 1160F PR REVIEW ALL MEDS BY PRESCRIBER/CLIN PHARMACIST DOCUMENTED: ICD-10-PCS | Mod: CPTII,S$GLB,, | Performed by: PSYCHIATRY & NEUROLOGY

## 2022-02-14 PROCEDURE — 1126F PR PAIN SEVERITY QUANTIFIED, NO PAIN PRESENT: ICD-10-PCS | Mod: CPTII,S$GLB,, | Performed by: PSYCHIATRY & NEUROLOGY

## 2022-02-14 PROCEDURE — 1160F RVW MEDS BY RX/DR IN RCRD: CPT | Mod: CPTII,S$GLB,, | Performed by: PSYCHIATRY & NEUROLOGY

## 2022-02-14 RX ORDER — ORAL SEMAGLUTIDE 7 MG/1
7 TABLET ORAL DAILY
COMMUNITY
Start: 2021-08-26

## 2022-02-14 RX ORDER — GABAPENTIN 400 MG/1
CAPSULE ORAL
Qty: 270 CAPSULE | Refills: 3 | Status: SHIPPED | OUTPATIENT
Start: 2022-02-14 | End: 2022-08-23 | Stop reason: SDUPTHER

## 2022-02-14 NOTE — PROGRESS NOTES
HPI: Hima Leija is a 88 y.o. male with history of numbness in the legs for several years.     Patient is here for routine follow up    He states pain is well controlled.   Some burning pain noted if he skips a dose. Takes this TID depending on symptoms and this works well.    Still taking B12     No new weakness or numbness    Reports A1C followed by PCP. He thinks this is doing well, but does take a different medication more recently which seems to hep    Weight is 10 pounds with diet      Review of Systems   Constitutional: Negative for fever.   HENT: Negative for nosebleeds.    Eyes: Negative for double vision.   Respiratory: Negative for hemoptysis.    Cardiovascular: Negative for leg swelling.   Gastrointestinal: Negative for blood in stool.   Genitourinary: Negative for hematuria.   Musculoskeletal: Negative for falls.   Skin: Negative for rash.   Neurological: Positive for sensory change.   Endo/Heme/Allergies: Negative for environmental allergies.   Psychiatric/Behavioral: Negative for memory loss.         I have reviewed all of this patient's past medical and surgical histories as well as family and social histories and active allergies and medications as documented in the electronic medical record.        Exam:  Gen Appearance, well developed/nourished in no apparent distress  CV: 2+ distal pulses with no edema or swelling  Neuro:  MS: Awake, alert, . Sustains attention. Recent/remote memory intact, Language is full to spontaneous speech/repetition/naming/comprehension. Fund of Knowledge is full  CN: Optic discs are flat with normal vasculature, PERRL, Extraoccular movements and visual fields are full. Normal facial sensation and strength, Hearing symmetric, Tongue and Palate are midline and strong. Shoulder Shrug symmetric and strong.  Motor: Normal bulk, tone, no abnormal movements. 5/5 strength bilateral upper/lower extremities with 2+ reflexes in the legs and 1+ in the arms and no  clonus  Sensory: symmetric to light touch, pain, temp, and vibration- but reduced in the feet to vibration and pin.  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 male with DM2 and several years of neuropathic pain in the feet suggestive of Neuropathy. 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 Note his DM2, poorly controlled previously now improving per him. The importance of good DM control and checking feet daily for sores reviewed prior. Goal A1C less than 7  -B12 low normal.  Taking 1000mcg B12 nightly- follow up B12 level now per orders  -Gabapentin 400mg TID is helpful- continue  -reduced obesity through diet, exercise as tolerated reviewed- no longer obese (overweight)  RTC 1 year

## 2022-08-09 ENCOUNTER — TELEPHONE (OUTPATIENT)
Dept: NEUROLOGY | Facility: CLINIC | Age: 87
End: 2022-08-09
Payer: MEDICARE

## 2022-08-09 NOTE — TELEPHONE ENCOUNTER
Notify patient about granddaughter's  call (assuming we don't have his signed permission to discuss his case with her).  If he is sleepy or wants to make a med switch, suggest a sooner appointment than previously planned to discuss

## 2022-08-09 NOTE — TELEPHONE ENCOUNTER
I spoke with patient's granddaughter.  She expressed concerns about Mr. Rabago sleeping way too much on the gabapentin. 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  He takes the gabapentin for the burning in his feet.  Please advise.

## 2022-08-09 NOTE — TELEPHONE ENCOUNTER
----- Message from Peace Oates sent at 2022  3:29 PM CDT -----  Contact: lilo/granddaughter  Hima Leija  MRN: [id number]  : 1933  PCP: Darci Rooney  Home Phone      [phone]  Work Phone      Not on file.  Mobile          [phone]  Home Phone      [phone]      MESSAGE:Have questions and concerns about NEURONTIN.      Phone [phone]

## 2022-08-23 ENCOUNTER — LAB VISIT (OUTPATIENT)
Dept: LAB | Facility: HOSPITAL | Age: 87
End: 2022-08-23
Attending: NURSE PRACTITIONER
Payer: MEDICARE

## 2022-08-23 ENCOUNTER — OFFICE VISIT (OUTPATIENT)
Dept: NEUROLOGY | Facility: CLINIC | Age: 87
End: 2022-08-23
Payer: MEDICARE

## 2022-08-23 VITALS
DIASTOLIC BLOOD PRESSURE: 70 MMHG | SYSTOLIC BLOOD PRESSURE: 130 MMHG | HEART RATE: 73 BPM | HEIGHT: 68 IN | RESPIRATION RATE: 16 BRPM | BODY MASS INDEX: 29.4 KG/M2 | WEIGHT: 194 LBS

## 2022-08-23 DIAGNOSIS — I10 HYPERTENSION, UNSPECIFIED TYPE: ICD-10-CM

## 2022-08-23 DIAGNOSIS — E53.8 B12 DEFICIENCY: ICD-10-CM

## 2022-08-23 DIAGNOSIS — G62.9 NEUROPATHY: ICD-10-CM

## 2022-08-23 DIAGNOSIS — R53.83 FATIGUE, UNSPECIFIED TYPE: ICD-10-CM

## 2022-08-23 DIAGNOSIS — E11.69 TYPE 2 DIABETES MELLITUS WITH OTHER SPECIFIED COMPLICATION, WITHOUT LONG-TERM CURRENT USE OF INSULIN: Primary | ICD-10-CM

## 2022-08-23 LAB
ALBUMIN SERPL BCP-MCNC: 4 G/DL (ref 3.5–5.2)
ALP SERPL-CCNC: 100 U/L (ref 55–135)
ALT SERPL W/O P-5'-P-CCNC: 14 U/L (ref 10–44)
ANION GAP SERPL CALC-SCNC: 10 MMOL/L (ref 8–16)
AST SERPL-CCNC: 14 U/L (ref 10–40)
BASOPHILS # BLD AUTO: 0.03 K/UL (ref 0–0.2)
BASOPHILS NFR BLD: 0.6 % (ref 0–1.9)
BILIRUB SERPL-MCNC: 0.6 MG/DL (ref 0.1–1)
BUN SERPL-MCNC: 19 MG/DL (ref 8–23)
CALCIUM SERPL-MCNC: 9.7 MG/DL (ref 8.7–10.5)
CHLORIDE SERPL-SCNC: 102 MMOL/L (ref 95–110)
CO2 SERPL-SCNC: 26 MMOL/L (ref 23–29)
CREAT SERPL-MCNC: 1.1 MG/DL (ref 0.5–1.4)
DIFFERENTIAL METHOD: ABNORMAL
EOSINOPHIL # BLD AUTO: 0.1 K/UL (ref 0–0.5)
EOSINOPHIL NFR BLD: 2.1 % (ref 0–8)
ERYTHROCYTE [DISTWIDTH] IN BLOOD BY AUTOMATED COUNT: 13.4 % (ref 11.5–14.5)
EST. GFR  (NO RACE VARIABLE): >60 ML/MIN/1.73 M^2
GLUCOSE SERPL-MCNC: 180 MG/DL (ref 70–110)
HCT VFR BLD AUTO: 40.9 % (ref 40–54)
HGB BLD-MCNC: 14.5 G/DL (ref 14–18)
IMM GRANULOCYTES # BLD AUTO: 0.04 K/UL (ref 0–0.04)
IMM GRANULOCYTES NFR BLD AUTO: 0.8 % (ref 0–0.5)
LYMPHOCYTES # BLD AUTO: 2 K/UL (ref 1–4.8)
LYMPHOCYTES NFR BLD: 37.5 % (ref 18–48)
MCH RBC QN AUTO: 33.2 PG (ref 27–31)
MCHC RBC AUTO-ENTMCNC: 35.5 G/DL (ref 32–36)
MCV RBC AUTO: 94 FL (ref 82–98)
MONOCYTES # BLD AUTO: 0.3 K/UL (ref 0.3–1)
MONOCYTES NFR BLD: 6.2 % (ref 4–15)
NEUTROPHILS # BLD AUTO: 2.8 K/UL (ref 1.8–7.7)
NEUTROPHILS NFR BLD: 52.8 % (ref 38–73)
NRBC BLD-RTO: 0 /100 WBC
PLATELET # BLD AUTO: 188 K/UL (ref 150–450)
PMV BLD AUTO: 8.7 FL (ref 9.2–12.9)
POTASSIUM SERPL-SCNC: 4.7 MMOL/L (ref 3.5–5.1)
PROT SERPL-MCNC: 7.7 G/DL (ref 6–8.4)
RBC # BLD AUTO: 4.37 M/UL (ref 4.6–6.2)
SODIUM SERPL-SCNC: 138 MMOL/L (ref 136–145)
T4 SERPL-MCNC: 7.2 UG/DL (ref 4.5–11.5)
TSH SERPL DL<=0.005 MIU/L-ACNC: 2.95 UIU/ML (ref 0.4–4)
WBC # BLD AUTO: 5.2 K/UL (ref 3.9–12.7)

## 2022-08-23 PROCEDURE — 36415 COLL VENOUS BLD VENIPUNCTURE: CPT | Performed by: NURSE PRACTITIONER

## 2022-08-23 PROCEDURE — 99214 PR OFFICE/OUTPT VISIT, EST, LEVL IV, 30-39 MIN: ICD-10-PCS | Mod: S$GLB,,, | Performed by: NURSE PRACTITIONER

## 2022-08-23 PROCEDURE — 99999 PR PBB SHADOW E&M-EST. PATIENT-LVL IV: CPT | Mod: PBBFAC,,, | Performed by: NURSE PRACTITIONER

## 2022-08-23 PROCEDURE — 84443 ASSAY THYROID STIM HORMONE: CPT | Performed by: NURSE PRACTITIONER

## 2022-08-23 PROCEDURE — 84436 ASSAY OF TOTAL THYROXINE: CPT | Performed by: NURSE PRACTITIONER

## 2022-08-23 PROCEDURE — 1159F PR MEDICATION LIST DOCUMENTED IN MEDICAL RECORD: ICD-10-PCS | Mod: CPTII,S$GLB,, | Performed by: NURSE PRACTITIONER

## 2022-08-23 PROCEDURE — 1160F RVW MEDS BY RX/DR IN RCRD: CPT | Mod: CPTII,S$GLB,, | Performed by: NURSE PRACTITIONER

## 2022-08-23 PROCEDURE — 85025 COMPLETE CBC W/AUTO DIFF WBC: CPT | Performed by: NURSE PRACTITIONER

## 2022-08-23 PROCEDURE — 1126F PR PAIN SEVERITY QUANTIFIED, NO PAIN PRESENT: ICD-10-PCS | Mod: CPTII,S$GLB,, | Performed by: NURSE PRACTITIONER

## 2022-08-23 PROCEDURE — 1126F AMNT PAIN NOTED NONE PRSNT: CPT | Mod: CPTII,S$GLB,, | Performed by: NURSE PRACTITIONER

## 2022-08-23 PROCEDURE — 99999 PR PBB SHADOW E&M-EST. PATIENT-LVL IV: ICD-10-PCS | Mod: PBBFAC,,, | Performed by: NURSE PRACTITIONER

## 2022-08-23 PROCEDURE — 80053 COMPREHEN METABOLIC PANEL: CPT | Performed by: NURSE PRACTITIONER

## 2022-08-23 PROCEDURE — 1159F MED LIST DOCD IN RCRD: CPT | Mod: CPTII,S$GLB,, | Performed by: NURSE PRACTITIONER

## 2022-08-23 PROCEDURE — 99214 OFFICE O/P EST MOD 30 MIN: CPT | Mod: S$GLB,,, | Performed by: NURSE PRACTITIONER

## 2022-08-23 PROCEDURE — 1160F PR REVIEW ALL MEDS BY PRESCRIBER/CLIN PHARMACIST DOCUMENTED: ICD-10-PCS | Mod: CPTII,S$GLB,, | Performed by: NURSE PRACTITIONER

## 2022-08-23 RX ORDER — GABAPENTIN 400 MG/1
400 CAPSULE ORAL NIGHTLY
Qty: 30 CAPSULE | Refills: 5 | Status: SHIPPED | OUTPATIENT
Start: 2022-08-23 | End: 2023-11-21 | Stop reason: SDUPTHER

## 2022-08-23 RX ORDER — GABAPENTIN 100 MG/1
200 CAPSULE ORAL 2 TIMES DAILY
Qty: 120 CAPSULE | Refills: 5 | Status: SHIPPED | OUTPATIENT
Start: 2022-08-23 | End: 2023-08-23

## 2022-08-23 RX ORDER — IRBESARTAN 300 MG/1
300 TABLET ORAL DAILY
COMMUNITY

## 2022-08-23 NOTE — PROGRESS NOTES
"HPI: Hima Leija is a 89 y.o. 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 He has DM II and HTN.     Patient is here at the request of his family, who called clinic to report daytime sleepiness. Family asked that a medication alternative to Gabapentin be considered for his neuropathy, as he drives frequently. He has been taking this dose of Gabapentin (400 mg tid) since 10/2019.     Granddaughter is here today with patient, and she feels that he has been chronically sleepy during the daytime. He naps during the day, but she is concerned, as he does not wake easily at times.     Neuropathy pain is more bothersome at night.     He is still taking B12.     No new weakness or numbness. No falls.     Reports A1C followed by PCP. He reports that this is "well managed".     Weight is 10 pounds with diet.     Review of Systems   Constitutional: Positive for malaise/fatigue. Negative for fever.   HENT: Negative for nosebleeds.    Eyes: Negative for double vision.   Respiratory: Negative for hemoptysis.    Cardiovascular: Negative for leg swelling.   Gastrointestinal: Negative for blood in stool.   Genitourinary: Negative for hematuria.   Musculoskeletal: Negative for falls.   Skin: Negative for rash.   Neurological: Positive for sensory change.   Endo/Heme/Allergies: Negative for environmental allergies.   Psychiatric/Behavioral: Negative for memory loss.       I have reviewed all of this patient's past medical and surgical histories as well as family and social histories and active allergies and medications as documented in the electronic medical record.    Exam:  Gen Appearance, well developed/nourished in no apparent distress  CV: 2+ distal pulses with no edema or swelling  Neuro:  MS: Awake, alert, . Sustains attention. Recent/remote memory intact, Language is full to spontaneous speech/repetition/naming/comprehension. Fund of Knowledge is full  CN: Optic discs are flat with normal vasculature, PERRL, Extraoccular " movements and visual fields are full. Normal facial sensation and strength, Hearing symmetric, Tongue and Palate are midline and strong. Shoulder Shrug symmetric and strong.  Motor: Normal bulk, tone, no abnormal movements. 5/5 strength bilateral upper/lower extremities with 2+ reflexes in the legs and 1+ in the arms and no clonus  Sensory: symmetric to light touch, pain, temp, and vibration- but reduced in the feet to vibration and pin.  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 male with DM2 and several years of neuropathic pain in the feet suggestive of Neuropathy. 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     I recommend:   1. Note his DM2, poorly controlled previously now improved per patient. The importance of good DM control and checking feet daily for sores reviewed prior. Goal A1C less than 7.     -B12 low normal. Taking 1000mcg B12 nightly- follow up B12 level over time.     -reduced obesity through diet, exercise as tolerated reviewed- no longer obese (overweight).     2. He has taken the same dose of Gabapentin long term, for three years, without prior complaint of fatigue, but his A1c is improved, and he may not require higher dosing during the day.   -Continue Gabapentin 400 mg tid, but reduce daytime dose to 200 mg tid. Advised family/patient to notify clinic via call in 2 weeks on response to reduce daytime Gabapentin dose.     3. Check CBC, CMP, TSH, T4 today, given fatigue complaint. It is important to evaluate renal  function specifically, as any decline can increase potential side effects of Gabapentin, including fatigue.     RTC as scheduled in 6 months, but call clinic with update on response to Gabapentin reduction in 2 weeks.

## 2023-11-21 ENCOUNTER — TELEPHONE (OUTPATIENT)
Dept: NEUROLOGY | Facility: CLINIC | Age: 88
End: 2023-11-21
Payer: MEDICARE

## 2023-11-21 DIAGNOSIS — G62.9 NEUROPATHY: ICD-10-CM

## 2023-11-21 RX ORDER — GABAPENTIN 100 MG/1
200 CAPSULE ORAL 2 TIMES DAILY
Qty: 360 CAPSULE | Refills: 1 | Status: SHIPPED | OUTPATIENT
Start: 2023-11-21

## 2023-11-21 NOTE — TELEPHONE ENCOUNTER
Pharmacy requesting a refill on patients Gabapentin 100 MG ( Take 2  capsules by mouth twice daily - Morning and Mid-Day)    I did contact patients family to verify that dosage he is taking. They reported 100 MG capsules.    Please advise - I do not see this on his med list to pend.      *Pharmacy - [facility] [place]*

## 2023-12-29 PROBLEM — J96.01 ACUTE RESPIRATORY FAILURE WITH HYPOXIA: Status: ACTIVE | Noted: 2023-12-29

## 2023-12-29 PROBLEM — I48.91 NEW ONSET A-FIB: Status: ACTIVE | Noted: 2023-12-29

## 2023-12-29 PROBLEM — J18.9 PNEUMONIA OF BOTH LOWER LOBES DUE TO INFECTIOUS ORGANISM: Status: ACTIVE | Noted: 2023-12-29

## 2023-12-29 PROBLEM — A41.9 SEPSIS: Status: ACTIVE | Noted: 2023-12-29

## 2024-04-01 PROBLEM — J18.9 PNEUMONIA OF BOTH LOWER LOBES DUE TO INFECTIOUS ORGANISM: Status: RESOLVED | Noted: 2023-12-29 | Resolved: 2024-04-01

## 2024-04-01 PROBLEM — A41.9 SEPSIS: Status: RESOLVED | Noted: 2023-12-29 | Resolved: 2024-04-01

## 2024-04-01 PROBLEM — J96.01 ACUTE RESPIRATORY FAILURE WITH HYPOXIA: Status: RESOLVED | Noted: 2023-12-29 | Resolved: 2024-04-01

## 2024-09-09 DIAGNOSIS — G62.9 NEUROPATHY: ICD-10-CM

## 2024-09-09 NOTE — TELEPHONE ENCOUNTER
----- Message from Radha Garrett sent at 2024  2:15 PM CDT -----  Contact: Granddaughter  Hima Leija  MRN: [id number]  : 1933  PCP: Darci Rooney  Home Phone      [phone]  Work Phone      Not on file.  Mobile          [phone]  Home Phone      Not on file.      MESSAGE: Patient is requesting to have a new prescription for Gabapentin 100 mg sent to [facility] in [place]    PHONE; [phone]

## 2024-09-10 RX ORDER — GABAPENTIN 100 MG/1
200 CAPSULE ORAL 2 TIMES DAILY
Qty: 360 CAPSULE | Refills: 0 | Status: SHIPPED | OUTPATIENT
Start: 2024-09-10

## 2025-01-24 ENCOUNTER — HOSPITAL ENCOUNTER (EMERGENCY)
Facility: HOSPITAL | Age: OVER 89
Discharge: ANOTHER HOSPITAL | End: 2025-01-24
Payer: MEDICARE

## 2025-01-24 VITALS
SYSTOLIC BLOOD PRESSURE: 181 MMHG | TEMPERATURE: 98 F | HEART RATE: 93 BPM | DIASTOLIC BLOOD PRESSURE: 98 MMHG | BODY MASS INDEX: 27.55 KG/M2 | WEIGHT: 181.25 LBS | OXYGEN SATURATION: 97 % | RESPIRATION RATE: 19 BRPM

## 2025-01-24 DIAGNOSIS — J90 PLEURAL EFFUSION, BILATERAL: Primary | ICD-10-CM

## 2025-01-24 DIAGNOSIS — R06.02 SHORTNESS OF BREATH: ICD-10-CM

## 2025-01-24 DIAGNOSIS — M79.89 LEG SWELLING: ICD-10-CM

## 2025-01-24 LAB
ALBUMIN SERPL BCP-MCNC: 3.5 G/DL (ref 3.5–5.2)
ALP SERPL-CCNC: 108 U/L (ref 40–150)
ALT SERPL W/O P-5'-P-CCNC: 19 U/L (ref 10–44)
ANION GAP SERPL CALC-SCNC: 11 MMOL/L (ref 8–16)
AST SERPL-CCNC: 22 U/L (ref 10–40)
BACTERIA #/AREA URNS HPF: ABNORMAL /HPF
BASOPHILS # BLD AUTO: 0.02 K/UL (ref 0–0.2)
BASOPHILS NFR BLD: 0.4 % (ref 0–1.9)
BILIRUB SERPL-MCNC: 1 MG/DL (ref 0.1–1)
BILIRUB UR QL STRIP: NEGATIVE
BNP SERPL-MCNC: 222 PG/ML (ref 0–99)
BUN SERPL-MCNC: 35 MG/DL (ref 10–30)
CALCIUM SERPL-MCNC: 9 MG/DL (ref 8.7–10.5)
CHLORIDE SERPL-SCNC: 102 MMOL/L (ref 95–110)
CLARITY UR: CLEAR
CO2 SERPL-SCNC: 29 MMOL/L (ref 23–29)
COLOR UR: YELLOW
CREAT SERPL-MCNC: 0.8 MG/DL (ref 0.5–1.4)
D DIMER PPP IA.FEU-MCNC: 1.51 MG/L FEU
DIFFERENTIAL METHOD BLD: ABNORMAL
EOSINOPHIL # BLD AUTO: 0 K/UL (ref 0–0.5)
EOSINOPHIL NFR BLD: 0.9 % (ref 0–8)
ERYTHROCYTE [DISTWIDTH] IN BLOOD BY AUTOMATED COUNT: 15.9 % (ref 11.5–14.5)
EST. GFR  (NO RACE VARIABLE): >60 ML/MIN/1.73 M^2
GLUCOSE SERPL-MCNC: 125 MG/DL (ref 70–110)
GLUCOSE UR QL STRIP: NEGATIVE
HCT VFR BLD AUTO: 33.8 % (ref 40–54)
HGB BLD-MCNC: 10.7 G/DL (ref 14–18)
HGB UR QL STRIP: ABNORMAL
HYALINE CASTS #/AREA URNS LPF: 0 /LPF
IMM GRANULOCYTES # BLD AUTO: 0.03 K/UL (ref 0–0.04)
IMM GRANULOCYTES NFR BLD AUTO: 0.6 % (ref 0–0.5)
INFLUENZA A, MOLECULAR: NEGATIVE
INFLUENZA B, MOLECULAR: NEGATIVE
KETONES UR QL STRIP: NEGATIVE
LACTATE SERPL-SCNC: 1.5 MMOL/L (ref 0.5–2.2)
LEUKOCYTE ESTERASE UR QL STRIP: NEGATIVE
LYMPHOCYTES # BLD AUTO: 0.9 K/UL (ref 1–4.8)
LYMPHOCYTES NFR BLD: 18.3 % (ref 18–48)
MCH RBC QN AUTO: 32.2 PG (ref 27–31)
MCHC RBC AUTO-ENTMCNC: 31.7 G/DL (ref 32–36)
MCV RBC AUTO: 102 FL (ref 82–98)
MICROSCOPIC COMMENT: ABNORMAL
MONOCYTES # BLD AUTO: 0.3 K/UL (ref 0.3–1)
MONOCYTES NFR BLD: 5.5 % (ref 4–15)
NEUTROPHILS # BLD AUTO: 3.5 K/UL (ref 1.8–7.7)
NEUTROPHILS NFR BLD: 74.3 % (ref 38–73)
NITRITE UR QL STRIP: NEGATIVE
NRBC BLD-RTO: 0 /100 WBC
OHS QRS DURATION: 94 MS
OHS QTC CALCULATION: 464 MS
PH UR STRIP: 8 [PH] (ref 5–8)
PLATELET # BLD AUTO: 142 K/UL (ref 150–450)
PMV BLD AUTO: 9.1 FL (ref 9.2–12.9)
POTASSIUM SERPL-SCNC: 3.7 MMOL/L (ref 3.5–5.1)
PROT SERPL-MCNC: 8.2 G/DL (ref 6–8.4)
PROT UR QL STRIP: ABNORMAL
RBC # BLD AUTO: 3.32 M/UL (ref 4.6–6.2)
RBC #/AREA URNS HPF: 7 /HPF (ref 0–4)
SARS-COV-2 RDRP RESP QL NAA+PROBE: NEGATIVE
SODIUM SERPL-SCNC: 142 MMOL/L (ref 136–145)
SP GR UR STRIP: 1.01 (ref 1–1.03)
SPECIMEN SOURCE: NORMAL
SQUAMOUS #/AREA URNS HPF: 2 /HPF
TROPONIN I SERPL DL<=0.01 NG/ML-MCNC: 0.01 NG/ML (ref 0–0.03)
URN SPEC COLLECT METH UR: ABNORMAL
UROBILINOGEN UR STRIP-ACNC: ABNORMAL EU/DL
WBC # BLD AUTO: 4.7 K/UL (ref 3.9–12.7)
WBC #/AREA URNS HPF: 2 /HPF (ref 0–5)

## 2025-01-24 PROCEDURE — 83605 ASSAY OF LACTIC ACID: CPT

## 2025-01-24 PROCEDURE — 94640 AIRWAY INHALATION TREATMENT: CPT

## 2025-01-24 PROCEDURE — 99285 EMERGENCY DEPT VISIT HI MDM: CPT | Mod: 25

## 2025-01-24 PROCEDURE — 63600175 PHARM REV CODE 636 W HCPCS

## 2025-01-24 PROCEDURE — 96365 THER/PROPH/DIAG IV INF INIT: CPT

## 2025-01-24 PROCEDURE — 94760 N-INVAS EAR/PLS OXIMETRY 1: CPT

## 2025-01-24 PROCEDURE — 94761 N-INVAS EAR/PLS OXIMETRY MLT: CPT

## 2025-01-24 PROCEDURE — 87502 INFLUENZA DNA AMP PROBE: CPT

## 2025-01-24 PROCEDURE — 27000221 HC OXYGEN, UP TO 24 HOURS

## 2025-01-24 PROCEDURE — 99900035 HC TECH TIME PER 15 MIN (STAT)

## 2025-01-24 PROCEDURE — 25500020 PHARM REV CODE 255

## 2025-01-24 PROCEDURE — 83880 ASSAY OF NATRIURETIC PEPTIDE: CPT

## 2025-01-24 PROCEDURE — 96375 TX/PRO/DX INJ NEW DRUG ADDON: CPT

## 2025-01-24 PROCEDURE — 25000003 PHARM REV CODE 250

## 2025-01-24 PROCEDURE — 80053 COMPREHEN METABOLIC PANEL: CPT

## 2025-01-24 PROCEDURE — 87040 BLOOD CULTURE FOR BACTERIA: CPT | Mod: 59

## 2025-01-24 PROCEDURE — 85025 COMPLETE CBC W/AUTO DIFF WBC: CPT

## 2025-01-24 PROCEDURE — 87635 SARS-COV-2 COVID-19 AMP PRB: CPT

## 2025-01-24 PROCEDURE — 25000242 PHARM REV CODE 250 ALT 637 W/ HCPCS

## 2025-01-24 PROCEDURE — 81000 URINALYSIS NONAUTO W/SCOPE: CPT

## 2025-01-24 PROCEDURE — 93005 ELECTROCARDIOGRAM TRACING: CPT

## 2025-01-24 PROCEDURE — 85379 FIBRIN DEGRADATION QUANT: CPT

## 2025-01-24 PROCEDURE — 84484 ASSAY OF TROPONIN QUANT: CPT

## 2025-01-24 PROCEDURE — 99900031 HC PATIENT EDUCATION (STAT)

## 2025-01-24 PROCEDURE — 93010 ELECTROCARDIOGRAM REPORT: CPT | Mod: ,,, | Performed by: INTERNAL MEDICINE

## 2025-01-24 RX ORDER — FUROSEMIDE 10 MG/ML
40 INJECTION INTRAMUSCULAR; INTRAVENOUS
Status: COMPLETED | OUTPATIENT
Start: 2025-01-24 | End: 2025-01-24

## 2025-01-24 RX ORDER — IPRATROPIUM BROMIDE AND ALBUTEROL SULFATE 2.5; .5 MG/3ML; MG/3ML
3 SOLUTION RESPIRATORY (INHALATION)
Status: COMPLETED | OUTPATIENT
Start: 2025-01-24 | End: 2025-01-24

## 2025-01-24 RX ORDER — CEFTRIAXONE 1 G/1
1 INJECTION, POWDER, FOR SOLUTION INTRAMUSCULAR; INTRAVENOUS
Status: COMPLETED | OUTPATIENT
Start: 2025-01-24 | End: 2025-01-24

## 2025-01-24 RX ADMIN — IPRATROPIUM BROMIDE AND ALBUTEROL SULFATE 3 ML: 2.5; .5 SOLUTION RESPIRATORY (INHALATION) at 02:01

## 2025-01-24 RX ADMIN — AZITHROMYCIN MONOHYDRATE 500 MG: 500 INJECTION, POWDER, LYOPHILIZED, FOR SOLUTION INTRAVENOUS at 03:01

## 2025-01-24 RX ADMIN — FUROSEMIDE 40 MG: 10 INJECTION, SOLUTION INTRAMUSCULAR; INTRAVENOUS at 04:01

## 2025-01-24 RX ADMIN — CEFTRIAXONE SODIUM 1 G: 1 INJECTION, POWDER, FOR SOLUTION INTRAMUSCULAR; INTRAVENOUS at 03:01

## 2025-01-24 RX ADMIN — IOHEXOL 100 ML: 350 INJECTION, SOLUTION INTRAVENOUS at 01:01

## 2025-01-24 NOTE — NURSING
Patient with complaints of being unable to void and feeling bladder pressure. Bladder scan showing 456 cc. In and out cath producing 575 cc of urine and patient stating relief.

## 2025-01-24 NOTE — PROVIDER TRANSFER
Outside Transfer Acceptance Note / Regional Referral Center    Referring facility: [facility]   Referring provider: SANTOS RUIZ  Accepting facility: [facility]  Accepting provider: SAFIA PURDY  Admitting provider: Jevon  Reason for transfer:  pulmonology consultation  Transfer diagnosis: bilateral pleural effusion  Transfer specialty requested: Pulmonology  Transfer specialty notified: No  Transfer level: NUMBER 1-5: 2  Bed type requested: Tele  Isolation status: No active isolations   Admission class or status: IP- Inpatient      Narrative     Patient is a 89-year-old male with a past medical history of hypertension, diabetes, atrial fibrillation and recent discharge after treatment for right-sided pneumonia that presents to the emergency department because of shortness of breath.  Patient satting 93% on room air.  Patient hypertensive and tachycardic.  Afebrile.  CT angio of the chest shows large bilateral pleural effusions, left greater than right.  Patchy airspace opacities in the right upper lobe.  No pulmonary emboli.  Patient given DuoNeb, azithromycin, Rocephin and Lasix.  Patient to be transferred to higher level of care for pulmonology consultation and possible thoracentesis.    Objective     Vitals: Temp: 97.8 °F (36.6 °C) (01/24/25 0315)  Pulse: 102 (01/24/25 0721)  Resp: (!) 26 (01/24/25 0721)  BP: (!) 151/90 (01/24/25 0721)  SpO2: 97 % (01/24/25 0722)  Recent Labs: All pertinent labs within the past 24 hours have been reviewed.  Recent imaging: see chart   Airway:     Vent settings:         IV access:        Peripheral IV - Single Lumen 01/24/25 0021 18 G Left Antecubital (Active)   Site Assessment Clean;Dry;Intact;No redness;No swelling 01/24/25 0026   Extremity Assessment Distal to IV No swelling;No warmth;No redness;No abnormal discoloration 01/24/25 0026   Line Status Blood return noted;Flushed;Capped;Saline locked 01/24/25 0026   Dressing Status  Clean;Dry;Intact 01/24/25 0026     Infusions: see chart  Allergies: Review of patient's allergies indicates:  No Known Allergies   NPO: No    Anticoagulation:   Anticoagulants       None             Instructions      [facility]  Admit to Hospital Medicine  Upon patient arrival to floor, please contact Hospital Medicine on call.

## 2025-01-24 NOTE — ED PROVIDER NOTES
Encounter Date: 1/24/2025    Source of History:   Patient and medical record, without language barrier or      Chief complaint:  Shortness of Breath (Pt to ED with c/o of SOB and bilateral leg swelling. Pt was on oxygen as needed but just got upped to continuous oxygen of 4L nasal cannula at home about a week ago. Pt denies chest pain. Pt shaking in triage. )    HPI:  Hima Leija is a 89 y.o. male with HTN, dm 2, Afib, recent hospital admission for right sided pneumonia discharged on home O2 presenting with chief complaint of shortness of breath.  Patient presents to the ER accompanied by his daughter.  He reports that he was discharged after 7 day admission for pneumonia and was told to use oxygen p.r.n..  Recently he was weaned up to 4 L nasal cannula continuously about 1 week ago.  Patient has had continued and worsening shortness of breath during this time.  He has also been having worsening bilateral lower extremity edema.  Patient has had lower extremity edema before but never to this extent.  Denies chest pain.    This is the extent to the patients complaints today here in the emergency department.    ROS: A review of systems was conducted with pertinent positive and negative findings documented in HPI with all other systems reviewed and negative.  ROS    Review of patient's allergies indicates:  No Known Allergies    PMH:  As per HPI and below:  Past Medical History:   Diagnosis Date    Bleeding from the nose     R/T ASPIRIN USE    CHF (congestive heart failure)     Coronary artery disease     Diabetes mellitus     [place] (hard of hearing)     Hyperlipidemia     Hypertension     Osteoarthritis     PAD (peripheral artery disease)     Paroxysmal atrial fibrillation     Wears dentures     UPPER      Past Surgical History:   Procedure Laterality Date    BACK SURGERY      CARDIAC SURGERY      CATARACT EXTRACTION, BILATERAL      COLONOSCOPY      CORONARY ARTERY BYPASS GRAFT      JOINT REPLACEMENT Left  2019    knee    TOTAL KNEE ARTHROPLASTY Left 2019    Procedure: ARTHROPLASTY, KNEE, TOTAL, left;  Surgeon: ISABEL Gaitan II, MD;  Location: [facility];  Service: Orthopedics;  Laterality: Left;     Social History     Socioeconomic History    Marital status:    Tobacco Use    Smoking status: Former     Current packs/day: 0.00     Types: Cigarettes     Quit date:      Years since quittin.1    Smokeless tobacco: Never   Substance and Sexual Activity    Alcohol use: Not Currently     Alcohol/week: 1.0 standard drink of alcohol     Types: 1 Glasses of wine per week    Drug use: Never    Sexual activity: Not Currently     Partners: Female     Social Drivers of Health     Financial Resource Strain: Low Risk  (2023)    Overall Financial Resource Strain (CARDIA)     Difficulty of Paying Living Expenses: Not hard at all   Food Insecurity: No Food Insecurity (2023)    Hunger Vital Sign     Worried About Running Out of Food in the Last Year: Never true     Ran Out of Food in the Last Year: Never true   Transportation Needs: No Transportation Needs (2023)    PRAPARE - Transportation     Lack of Transportation (Medical): No     Lack of Transportation (Non-Medical): No   Housing Stability: Unknown (2023)    Housing Stability Vital Sign     Unable to Pay for Housing in the Last Year: No     Unstable Housing in the Last Year: No     Vitals:    BP (!) 154/90   Pulse (!) 113   Temp 97.8 °F (36.6 °C) (Oral)   Resp (!) 28   SpO2 99%     Physical Exam  Vitals and nursing note reviewed.   Constitutional:       General: He is not in acute distress.     Appearance: He is not toxic-appearing.   HENT:      Head: Normocephalic and atraumatic.      Mouth/Throat:      Mouth: Mucous membranes are moist.   Eyes:      General: No scleral icterus.  Cardiovascular:      Rate and Rhythm: Normal rate and regular rhythm.      Pulses: Normal pulses.      Heart sounds: Normal heart sounds. No  murmur heard.     No friction rub. No gallop.   Pulmonary:      Effort: Pulmonary effort is normal. No respiratory distress.      Breath sounds: Normal breath sounds. No stridor. No wheezing, rhonchi or rales.   Abdominal:      General: Abdomen is flat.   Musculoskeletal:         General: No swelling or deformity.      Cervical back: Normal range of motion and neck supple.   Skin:     General: Skin is warm and dry.      Capillary Refill: Capillary refill takes less than 2 seconds.      Coloration: Skin is not jaundiced.      Findings: No rash.   Neurological:      Mental Status: Mental status is at baseline.       Procedures    Laboratory Studies:  Labs that have been ordered have been independently reviewed and interpreted by myself.  Labs Reviewed   CBC W/ AUTO DIFFERENTIAL - Abnormal       Result Value    WBC 4.70      RBC 3.32 (*)     Hemoglobin 10.7 (*)     Hematocrit 33.8 (*)      (*)     MCH 32.2 (*)     MCHC 31.7 (*)     RDW 15.9 (*)     Platelets 142 (*)     MPV 9.1 (*)     Immature Granulocytes 0.6 (*)     Gran # (ANC) 3.5      Immature Grans (Abs) 0.03      Lymph # 0.9 (*)     Mono # 0.3      Eos # 0.0      Baso # 0.02      nRBC 0      Gran % 74.3 (*)     Lymph % 18.3      Mono % 5.5      Eosinophil % 0.9      Basophil % 0.4      Differential Method Automated      Narrative:     Recoll. [id number] by SVN at 01/24/2025 00:45, reason: Specimen   clotted   COMPREHENSIVE METABOLIC PANEL - Abnormal    Sodium 142      Potassium 3.7      Chloride 102      CO2 29      Glucose 125 (*)     BUN 35 (*)     Creatinine 0.8      Calcium 9.0      Total Protein 8.2      Albumin 3.5      Total Bilirubin 1.0      Alkaline Phosphatase 108      AST 22      ALT 19      eGFR >60      Anion Gap 11     B-TYPE NATRIURETIC PEPTIDE - Abnormal     (*)     Narrative:     Recoll. [id number] by SVN at 01/24/2025 00:45, reason: Specimen   clotted   D DIMER, QUANTITATIVE - Abnormal    D-Dimer 1.51 (*)    URINALYSIS, REFLEX  TO URINE CULTURE - Abnormal    Specimen UA Urine, Clean Catch      Color, UA Yellow      Appearance, UA Clear      pH, UA 8.0      Specific Gravity, UA 1.015      Protein, UA 3+ (*)     Glucose, UA Negative      Ketones, UA Negative      Bilirubin (UA) Negative      Occult Blood UA Trace (*)     Nitrite, UA Negative      Urobilinogen, UA 2.0-3.0 (*)     Leukocytes, UA Negative      Narrative:     Specimen Source->Urine   URINALYSIS MICROSCOPIC - Abnormal    RBC, UA 7 (*)     WBC, UA 2      Bacteria Rare      Squam Epithel, UA 2      Hyaline Casts, UA 0      Microscopic Comment SEE COMMENT      Narrative:     Specimen Source->Urine   INFLUENZA A & B BY MOLECULAR    Influenza A, Molecular Negative      Influenza B, Molecular Negative      Flu A & B Source Nasal swab     CULTURE, BLOOD   CULTURE, BLOOD   TROPONIN I    Troponin I 0.013     SARS-COV-2 RNA AMPLIFICATION, QUAL    SARS-CoV-2 RNA, Amplification, Qual Negative     LACTIC ACID, PLASMA    Lactate (Lactic Acid) 1.5       Imaging Results              US Lower Extremity Veins Bilateral (Final result)  Result time 01/24/25 02:58:48      Final result by Igor Hudson DO (01/24/25 02:58:48)                   Impression:      No evidence of deep venous thrombosis in either lower extremity.      Electronically signed by: Igor Hudson  Date:    01/24/2025  Time:    02:58               Narrative:    EXAMINATION:  US LOWER EXTREMITY VEINS BILATERAL    CLINICAL HISTORY:  Other specified soft tissue disorders    TECHNIQUE:  Duplex and color flow Doppler and dynamic compression was performed of the bilateral lower extremity veins was performed.    COMPARISON:  None    FINDINGS:  Right thigh veins: The common femoral, femoral, popliteal, upper greater saphenous, and deep femoral veins are patent and free of thrombus. The veins are normally compressible and have normal phasic flow and augmentation response.    Right calf veins: The visualized calf veins are  patent.    Left thigh veins: The common femoral, femoral, popliteal, upper greater saphenous, and deep femoral veins are patent and free of thrombus. The veins are normally compressible and have normal phasic flow and augmentation response.    Left calf veins: The visualized calf veins are patent.    Miscellaneous: There is soft tissue edema.                                       CTA Chest Non-Coronary (PE Studies) (Final result)  Result time 01/24/25 02:05:48      Final result by Evens Gutierrez MD (01/24/25 02:05:48)                   Impression:      Examination degraded by motion.    No acute pulmonary thromboemboli.    Large bilateral pleural effusions, left larger than right.    Compressive atelectasis involving bilateral lower lobes as well as the lingula. Patchy airspace opacities in the right upper lobe.        Electronically signed by: Evens Gutierrez MD  Date:    01/24/2025  Time:    02:05               Narrative:    EXAMINATION:  CTA CHEST NON CORONARY (PE STUDIES)    CLINICAL HISTORY:  Pulmonary embolism (PE) suspected, positive D-dimer;    TECHNIQUE:  Low dose axial images, sagittal and coronal reformations were obtained from the thoracic inlet to the lung bases following the IV administration of 100 mL of Omnipaque 350.  Contrast timing was optimized to evaluate the pulmonary arteries.  MIP images were performed.    COMPARISON:  None    FINDINGS:  Examination degraded by motion.  Good contrast bolus opacification of the pulmonary arteries. No acute pulmonary thromboembolism. No hilar or mediastinal mass or lymphadenopathy. Large bilateral pleural effusions, left larger than right.  Compressive atelectasis involving bilateral lower lobes as well as the lingula.  Patchy airspace opacities in the right upper lobe.  Limited images through the upper abdomen are unremarkable.                                      EKG (independently interpreted by me): Rhythm:  AFib, rate of  96 BPM, no ST elevations or  depressions, QTc 464    I decided to obtain the patient's medical records.  Summary of Medical Records:    Medications   iohexoL (OMNIPAQUE 350) injection 100 mL (100 mLs Intravenous Given 1/24/25 0135)   albuterol-ipratropium 2.5 mg-0.5 mg/3 mL nebulizer solution 3 mL (3 mLs Nebulization Given 1/24/25 0254)   cefTRIAXone injection 1 g (1 g Intravenous Given 1/24/25 0305)   azithromycin (ZITHROMAX) 500 mg in 0.9% NaCl 250 mL IVPB (admixture device) (0 mg Intravenous Stopped 1/24/25 0411)   furosemide injection 40 mg (40 mg Intravenous Given 1/24/25 0413)     MDM:    89 y.o. male with shortness of breath    Differential Dx:  Differential includes but is not limited to CHF, pneumonia, PE, less likely ACS    ED Management:  Shortness of breath/cardiac workup started for acute presentation of an emergent condition.  COVID and flu negative.  Troponin negative.  D-dimer elevated, CT PE ordered.  No pulmonary embolism identified but showing significant large bilateral pleural effusions.  BNP mildly elevated, given Lasix for diuresis.  DVT ultrasound ordered to assess for causes of elevated D-dimer, however negative.  Due to large pleural effusions causing respiratory distress plan to transfer patient to facility with pulmonology services for likely thoracentesis.  Patient accepted at [place] for further workup and treatment of his large pleural effusions    Discussed with:  [facility] medicine and transfer center regarding admission for pulmonology evaluation    Medical Decision Making  Amount and/or Complexity of Data Reviewed  Labs: ordered. Decision-making details documented in ED Course.  Radiology: ordered.    Risk  Prescription drug management.         ED Course as of 01/24/25 0644   Fri Jan 24, 2025 0116 D-Dimer(!): 1.51  CT PE ordered [AW]   0233 Hemoglobin(!): 10.7  baseline [AW]      ED Course User Index  [AW] Talat Graham MD     Diagnostic Impression:    Final diagnoses:  [R06.02] Shortness of  breath  [M79.89] Leg swelling  [J90] Pleural effusion, bilateral (Primary)     ED Disposition Condition    Transfer to Another Facility Stable                   Talat Graham MD  01/24/25 8436

## 2025-01-25 ENCOUNTER — HOSPITAL ENCOUNTER (INPATIENT)
Facility: HOSPITAL | Age: OVER 89
LOS: 2 days | Discharge: HOME | DRG: 291 | End: 2025-01-27
Attending: HOSPITALIST | Admitting: SPECIALIST
Payer: MEDICARE

## 2025-01-25 DIAGNOSIS — R04.0 EPISTAXIS: Primary | ICD-10-CM

## 2025-01-25 DIAGNOSIS — I50.9 CHF (CONGESTIVE HEART FAILURE): ICD-10-CM

## 2025-01-25 DIAGNOSIS — I50.9 HEART FAILURE: ICD-10-CM

## 2025-01-25 DIAGNOSIS — R06.02 SHORTNESS OF BREATH: ICD-10-CM

## 2025-01-25 DIAGNOSIS — I50.33 ACUTE ON CHRONIC DIASTOLIC CONGESTIVE HEART FAILURE: ICD-10-CM

## 2025-01-25 DIAGNOSIS — J90 PLEURAL EFFUSION: ICD-10-CM

## 2025-01-25 PROBLEM — J96.11 CHRONIC RESPIRATORY FAILURE WITH HYPOXIA: Status: ACTIVE | Noted: 2025-01-25

## 2025-01-25 PROBLEM — E78.5 HLD (HYPERLIPIDEMIA): Status: ACTIVE | Noted: 2025-01-25

## 2025-01-25 PROBLEM — I25.10 CAD (CORONARY ARTERY DISEASE): Status: ACTIVE | Noted: 2025-01-25

## 2025-01-25 PROBLEM — E78.5 HLD (HYPERLIPIDEMIA): Chronic | Status: ACTIVE | Noted: 2025-01-25

## 2025-01-25 PROBLEM — I10 HYPERTENSION: Chronic | Status: ACTIVE | Noted: 2019-06-06

## 2025-01-25 PROBLEM — G62.9 NEUROPATHY: Chronic | Status: ACTIVE | Noted: 2022-08-23

## 2025-01-25 PROBLEM — R91.8 OPACITY OF LUNG ON IMAGING STUDY: Status: ACTIVE | Noted: 2025-01-25

## 2025-01-25 PROBLEM — I48.91 ATRIAL FIBRILLATION: Chronic | Status: ACTIVE | Noted: 2025-01-25

## 2025-01-25 PROBLEM — I25.10 CAD (CORONARY ARTERY DISEASE): Chronic | Status: ACTIVE | Noted: 2025-01-25

## 2025-01-25 PROBLEM — I48.91 ATRIAL FIBRILLATION: Status: ACTIVE | Noted: 2025-01-25

## 2025-01-25 PROBLEM — E11.9 TYPE 2 DIABETES MELLITUS, WITHOUT LONG-TERM CURRENT USE OF INSULIN: Chronic | Status: ACTIVE | Noted: 2019-06-06

## 2025-01-25 LAB
ALBUMIN FLD-MCNC: 1.8 G/DL
ALBUMIN SERPL BCP-MCNC: 3 G/DL (ref 3.5–5.2)
ALP SERPL-CCNC: 95 U/L (ref 40–150)
ALT SERPL W/O P-5'-P-CCNC: 16 U/L (ref 10–44)
AMYLASE, BODY FLUID: 37 U/L
ANION GAP SERPL CALC-SCNC: 12 MMOL/L (ref 8–16)
AORTIC ROOT ANNULUS: 3.18 CM
APPEARANCE FLD: CLEAR
AST SERPL-CCNC: 20 U/L (ref 10–40)
AV INDEX (PROSTH): 0.59
AV MEAN GRADIENT: 4 MMHG
AV PEAK GRADIENT: 7 MMHG
AV REGURGITATION PRESSURE HALF TIME: 483 MS
AV VALVE AREA BY VELOCITY RATIO: 1.9 CM²
AV VALVE AREA: 2.1 CM²
AV VELOCITY RATIO: 0.54
BASOPHILS # BLD AUTO: 0.02 K/UL (ref 0–0.2)
BASOPHILS NFR BLD: 0.6 % (ref 0–1.9)
BILIRUB SERPL-MCNC: 1 MG/DL (ref 0.1–1)
BNP SERPL-MCNC: 236 PG/ML (ref 0–99)
BODY FLD TYPE: NORMAL
BODY FLUID SOURCE AMYLASE: NORMAL
BODY FLUID SOURCE, LDH: NORMAL
BSA FOR ECHO PROCEDURE: 1.99 M2
BUN SERPL-MCNC: 27 MG/DL (ref 10–30)
CALCIUM SERPL-MCNC: 8.9 MG/DL (ref 8.7–10.5)
CHLORIDE SERPL-SCNC: 100 MMOL/L (ref 95–110)
CO2 SERPL-SCNC: 31 MMOL/L (ref 23–29)
COLOR FLD: YELLOW
CREAT SERPL-MCNC: 0.8 MG/DL (ref 0.5–1.4)
CV ECHO LV RWT: 0.5 CM
DIFFERENTIAL METHOD BLD: ABNORMAL
DOP CALC AO PEAK VEL: 1.3 M/S
DOP CALC AO VTI: 28.6 CM
DOP CALC LVOT AREA: 3.5 CM2
DOP CALC LVOT DIAMETER: 2.1 CM
DOP CALC LVOT PEAK VEL: 0.7 M/S
DOP CALC LVOT STROKE VOLUME: 58.9 CM3
DOP CALC RVOT PEAK VEL: 0.57 M/S
DOP CALC RVOT VTI: 15.1 CM
DOP CALCLVOT PEAK VEL VTI: 17 CM
E WAVE DECELERATION TIME: 202 MSEC
E/A RATIO: 2.04
E/E' RATIO: 13 M/S
ECHO LV POSTERIOR WALL: 1 CM (ref 0.6–1.1)
EOSINOPHIL # BLD AUTO: 0 K/UL (ref 0–0.5)
EOSINOPHIL NFR BLD: 1.2 % (ref 0–8)
ERYTHROCYTE [DISTWIDTH] IN BLOOD BY AUTOMATED COUNT: 16 % (ref 11.5–14.5)
EST. GFR  (NO RACE VARIABLE): >60 ML/MIN/1.73 M^2
ESTIMATED AVG GLUCOSE: 151 MG/DL (ref 68–131)
FRACTIONAL SHORTENING: 32.5 % (ref 28–44)
GLUCOSE FLD-MCNC: 139 MG/DL
GLUCOSE SERPL-MCNC: 162 MG/DL (ref 70–110)
HBA1C MFR BLD: 6.9 % (ref 4–5.6)
HCT VFR BLD AUTO: 32.5 % (ref 40–54)
HGB BLD-MCNC: 10.1 G/DL (ref 14–18)
IMM GRANULOCYTES # BLD AUTO: 0.01 K/UL (ref 0–0.04)
IMM GRANULOCYTES NFR BLD AUTO: 0.3 % (ref 0–0.5)
INTERVENTRICULAR SEPTUM: 0.9 CM (ref 0.6–1.1)
IVRT: 91 MSEC
LA MAJOR: 6.4 CM
LA MINOR: 6.6 CM
LA WIDTH: 3.8 CM
LDH FLD L TO P-CCNC: 106 U/L
LDH SERPL L TO P-CCNC: 209 U/L (ref 110–260)
LEFT ATRIUM AREA SYSTOLIC (APICAL 2 CHAMBER): 27.31 CM2
LEFT ATRIUM AREA SYSTOLIC (APICAL 4 CHAMBER): 22.59 CM2
LEFT ATRIUM SIZE: 4.6 CM
LEFT ATRIUM VOLUME INDEX MOD: 39 ML/M2
LEFT ATRIUM VOLUME INDEX: 49 ML/M2
LEFT ATRIUM VOLUME MOD: 77 ML
LEFT ATRIUM VOLUME: 97 CM3
LEFT INTERNAL DIMENSION IN SYSTOLE: 2.7 CM (ref 2.1–4)
LEFT VENTRICLE DIASTOLIC VOLUME INDEX: 35.65 ML/M2
LEFT VENTRICLE DIASTOLIC VOLUME: 69.88 ML
LEFT VENTRICLE END SYSTOLIC VOLUME APICAL 2 CHAMBER: 93.14 ML
LEFT VENTRICLE END SYSTOLIC VOLUME APICAL 4 CHAMBER: 62.19 ML
LEFT VENTRICLE MASS INDEX: 60.3 G/M2
LEFT VENTRICLE SYSTOLIC VOLUME INDEX: 13.5 ML/M2
LEFT VENTRICLE SYSTOLIC VOLUME: 26.49 ML
LEFT VENTRICULAR INTERNAL DIMENSION IN DIASTOLE: 4 CM (ref 3.5–6)
LEFT VENTRICULAR MASS: 118.2 G
LV LATERAL E/E' RATIO: 9.3 M/S
LV SEPTAL E/E' RATIO: 23.2 M/S
LVED V (TEICH): 69.88 ML
LVES V (TEICH): 26.49 ML
LVOT MG: 0.92 MMHG
LVOT MV: 0.45 CM/S
LYMPHOCYTES # BLD AUTO: 0.8 K/UL (ref 1–4.8)
LYMPHOCYTES NFR BLD: 23.1 % (ref 18–48)
LYMPHOCYTES NFR FLD MANUAL: 92 %
MAGNESIUM SERPL-MCNC: 1.6 MG/DL (ref 1.6–2.6)
MCH RBC QN AUTO: 31.9 PG (ref 27–31)
MCHC RBC AUTO-ENTMCNC: 31.1 G/DL (ref 32–36)
MCV RBC AUTO: 103 FL (ref 82–98)
MONOCYTES # BLD AUTO: 0.3 K/UL (ref 0.3–1)
MONOCYTES NFR BLD: 8.2 % (ref 4–15)
MONOS+MACROS NFR FLD MANUAL: 8 %
MV PEAK A VEL: 0.68 M/S
MV PEAK E VEL: 1.39 M/S
MV STENOSIS PRESSURE HALF TIME: 58.44 MS
MV VALVE AREA P 1/2 METHOD: 3.76 CM2
NEUTROPHILS # BLD AUTO: 2.2 K/UL (ref 1.8–7.7)
NEUTROPHILS NFR BLD: 66.6 % (ref 38–73)
NRBC BLD-RTO: 0 /100 WBC
OHS CV RV/LV RATIO: 0.83 CM
PISA AR MAX VEL: 2.74 M/S
PISA TR MAX VEL: 3.5 M/S
PLATELET # BLD AUTO: 142 K/UL (ref 150–450)
PMV BLD AUTO: 9.2 FL (ref 9.2–12.9)
POTASSIUM SERPL-SCNC: 3.3 MMOL/L (ref 3.5–5.1)
PROT FLD-MCNC: 3.5 G/DL
PROT SERPL-MCNC: 7 G/DL (ref 6–8.4)
PROT SERPL-MCNC: 7.6 G/DL (ref 6–8.4)
PULM VEIN S/D RATIO: 0.6
PV MEAN GRADIENT: 1 MMHG
PV MV: 0.58 M/S
PV PEAK D VEL: 0.63 M/S
PV PEAK GRADIENT: 3 MMHG
PV PEAK S VEL: 0.38 M/S
PV PEAK VELOCITY: 0.85 M/S
RA MAJOR: 5.82 CM
RA PRESSURE ESTIMATED: 15 MMHG
RA WIDTH: 4.2 CM
RBC # BLD AUTO: 3.17 M/UL (ref 4.6–6.2)
RIGHT VENTRICLE DIASTOLIC BASEL DIMENSION: 3.3 CM
RIGHT VENTRICULAR END-DIASTOLIC DIMENSION: 3.31 CM
RV TB RVSP: 19 MMHG
SODIUM SERPL-SCNC: 143 MMOL/L (ref 136–145)
SPECIMEN SOURCE: NORMAL
STJ: 2.6 CM
TDI LATERAL: 0.15 M/S
TDI SEPTAL: 0.06 M/S
TDI: 0.11 M/S
TR MAX PG: 49 MMHG
TRICUSPID ANNULAR PLANE SYSTOLIC EXCURSION: 1.43 CM
TV REST PULMONARY ARTERY PRESSURE: 64 MMHG
WBC # BLD AUTO: 3.29 K/UL (ref 3.9–12.7)
WBC # FLD: 1107 /CU MM
Z-SCORE OF LEFT VENTRICULAR DIMENSION IN END DIASTOLE: -3.38
Z-SCORE OF LEFT VENTRICULAR DIMENSION IN END SYSTOLE: -1.94

## 2025-01-25 PROCEDURE — 99223 1ST HOSP IP/OBS HIGH 75: CPT | Mod: 25,,, | Performed by: INTERNAL MEDICINE

## 2025-01-25 PROCEDURE — 83880 ASSAY OF NATRIURETIC PEPTIDE: CPT | Performed by: HOSPITALIST

## 2025-01-25 PROCEDURE — 87205 SMEAR GRAM STAIN: CPT | Performed by: INTERNAL MEDICINE

## 2025-01-25 PROCEDURE — 83615 LACTATE (LD) (LDH) ENZYME: CPT | Mod: 91 | Performed by: INTERNAL MEDICINE

## 2025-01-25 PROCEDURE — 25000003 PHARM REV CODE 250: Performed by: FAMILY MEDICINE

## 2025-01-25 PROCEDURE — 87206 SMEAR FLUORESCENT/ACID STAI: CPT | Performed by: INTERNAL MEDICINE

## 2025-01-25 PROCEDURE — 88305 TISSUE EXAM BY PATHOLOGIST: CPT | Performed by: STUDENT IN AN ORGANIZED HEALTH CARE EDUCATION/TRAINING PROGRAM

## 2025-01-25 PROCEDURE — 82150 ASSAY OF AMYLASE: CPT | Performed by: INTERNAL MEDICINE

## 2025-01-25 PROCEDURE — 82945 GLUCOSE OTHER FLUID: CPT | Performed by: INTERNAL MEDICINE

## 2025-01-25 PROCEDURE — 84311 SPECTROPHOTOMETRY: CPT | Performed by: INTERNAL MEDICINE

## 2025-01-25 PROCEDURE — 36415 COLL VENOUS BLD VENIPUNCTURE: CPT | Performed by: INTERNAL MEDICINE

## 2025-01-25 PROCEDURE — 84157 ASSAY OF PROTEIN OTHER: CPT | Performed by: INTERNAL MEDICINE

## 2025-01-25 PROCEDURE — 93005 ELECTROCARDIOGRAM TRACING: CPT

## 2025-01-25 PROCEDURE — 5A0935A ASSISTANCE WITH RESPIRATORY VENTILATION, LESS THAN 24 CONSECUTIVE HOURS, HIGH NASAL FLOW/VELOCITY: ICD-10-PCS | Performed by: HOSPITALIST

## 2025-01-25 PROCEDURE — 27000221 HC OXYGEN, UP TO 24 HOURS

## 2025-01-25 PROCEDURE — 85025 COMPLETE CBC W/AUTO DIFF WBC: CPT | Performed by: HOSPITALIST

## 2025-01-25 PROCEDURE — 25000003 PHARM REV CODE 250: Performed by: HOSPITALIST

## 2025-01-25 PROCEDURE — 83615 LACTATE (LD) (LDH) ENZYME: CPT | Performed by: INTERNAL MEDICINE

## 2025-01-25 PROCEDURE — 36415 COLL VENOUS BLD VENIPUNCTURE: CPT | Performed by: HOSPITALIST

## 2025-01-25 PROCEDURE — 88112 CYTOPATH CELL ENHANCE TECH: CPT | Mod: 26,,, | Performed by: STUDENT IN AN ORGANIZED HEALTH CARE EDUCATION/TRAINING PROGRAM

## 2025-01-25 PROCEDURE — 88112 CYTOPATH CELL ENHANCE TECH: CPT | Performed by: STUDENT IN AN ORGANIZED HEALTH CARE EDUCATION/TRAINING PROGRAM

## 2025-01-25 PROCEDURE — 84155 ASSAY OF PROTEIN SERUM: CPT | Performed by: INTERNAL MEDICINE

## 2025-01-25 PROCEDURE — 21400001 HC TELEMETRY ROOM

## 2025-01-25 PROCEDURE — 63600175 PHARM REV CODE 636 W HCPCS: Performed by: INTERNAL MEDICINE

## 2025-01-25 PROCEDURE — 25000003 PHARM REV CODE 250: Performed by: INTERNAL MEDICINE

## 2025-01-25 PROCEDURE — 89051 BODY FLUID CELL COUNT: CPT | Performed by: INTERNAL MEDICINE

## 2025-01-25 PROCEDURE — 99223 1ST HOSP IP/OBS HIGH 75: CPT | Mod: ,,, | Performed by: INTERNAL MEDICINE

## 2025-01-25 PROCEDURE — 80053 COMPREHEN METABOLIC PANEL: CPT | Performed by: HOSPITALIST

## 2025-01-25 PROCEDURE — 87102 FUNGUS ISOLATION CULTURE: CPT | Performed by: INTERNAL MEDICINE

## 2025-01-25 PROCEDURE — 93010 ELECTROCARDIOGRAM REPORT: CPT | Mod: ,,, | Performed by: INTERNAL MEDICINE

## 2025-01-25 PROCEDURE — 87116 MYCOBACTERIA CULTURE: CPT | Performed by: INTERNAL MEDICINE

## 2025-01-25 PROCEDURE — 94761 N-INVAS EAR/PLS OXIMETRY MLT: CPT

## 2025-01-25 PROCEDURE — 82042 OTHER SOURCE ALBUMIN QUAN EA: CPT | Performed by: INTERNAL MEDICINE

## 2025-01-25 PROCEDURE — 0W9B3ZZ DRAINAGE OF LEFT PLEURAL CAVITY, PERCUTANEOUS APPROACH: ICD-10-PCS | Performed by: INTERNAL MEDICINE

## 2025-01-25 PROCEDURE — 32555 ASPIRATE PLEURA W/ IMAGING: CPT | Mod: LT,,, | Performed by: INTERNAL MEDICINE

## 2025-01-25 PROCEDURE — 63700000 PHARM REV CODE 250 ALT 637 W/O HCPCS: Performed by: INTERNAL MEDICINE

## 2025-01-25 PROCEDURE — 83036 HEMOGLOBIN GLYCOSYLATED A1C: CPT | Performed by: HOSPITALIST

## 2025-01-25 PROCEDURE — 83735 ASSAY OF MAGNESIUM: CPT | Performed by: HOSPITALIST

## 2025-01-25 PROCEDURE — 88305 TISSUE EXAM BY PATHOLOGIST: CPT | Mod: 26,,, | Performed by: STUDENT IN AN ORGANIZED HEALTH CARE EDUCATION/TRAINING PROGRAM

## 2025-01-25 RX ORDER — SODIUM CHLORIDE 0.9 % (FLUSH) 0.9 %
10 SYRINGE (ML) INJECTION
Status: DISCONTINUED | OUTPATIENT
Start: 2025-01-25 | End: 2025-01-27 | Stop reason: HOSPADM

## 2025-01-25 RX ORDER — HYDROCHLOROTHIAZIDE 25 MG/1
25 TABLET ORAL DAILY
Status: DISCONTINUED | OUTPATIENT
Start: 2025-01-25 | End: 2025-01-25

## 2025-01-25 RX ORDER — POTASSIUM CHLORIDE 20 MEQ/1
40 TABLET, EXTENDED RELEASE ORAL DAILY
Status: DISCONTINUED | OUTPATIENT
Start: 2025-01-25 | End: 2025-01-26

## 2025-01-25 RX ORDER — CEFTRIAXONE 1 G/1
1 INJECTION, POWDER, FOR SOLUTION INTRAMUSCULAR; INTRAVENOUS
Status: DISCONTINUED | OUTPATIENT
Start: 2025-01-25 | End: 2025-01-27 | Stop reason: HOSPADM

## 2025-01-25 RX ORDER — PROMETHAZINE HYDROCHLORIDE 25 MG/1
25 TABLET ORAL EVERY 6 HOURS PRN
Status: DISCONTINUED | OUTPATIENT
Start: 2025-01-25 | End: 2025-01-27 | Stop reason: HOSPADM

## 2025-01-25 RX ORDER — ACETAMINOPHEN 325 MG/1
650 TABLET ORAL EVERY 4 HOURS PRN
Status: DISCONTINUED | OUTPATIENT
Start: 2025-01-25 | End: 2025-01-27 | Stop reason: HOSPADM

## 2025-01-25 RX ORDER — POLYETHYLENE GLYCOL 3350 17 G/17G
17 POWDER, FOR SOLUTION ORAL DAILY PRN
Status: DISCONTINUED | OUTPATIENT
Start: 2025-01-25 | End: 2025-01-27 | Stop reason: HOSPADM

## 2025-01-25 RX ORDER — FUROSEMIDE 10 MG/ML
40 INJECTION INTRAMUSCULAR; INTRAVENOUS DAILY
Status: DISCONTINUED | OUTPATIENT
Start: 2025-01-25 | End: 2025-01-25

## 2025-01-25 RX ORDER — ACETAMINOPHEN 325 MG/1
650 TABLET ORAL EVERY 8 HOURS PRN
Status: DISCONTINUED | OUTPATIENT
Start: 2025-01-25 | End: 2025-01-27 | Stop reason: HOSPADM

## 2025-01-25 RX ORDER — AMLODIPINE BESYLATE 10 MG/1
10 TABLET ORAL NIGHTLY
Status: DISCONTINUED | OUTPATIENT
Start: 2025-01-25 | End: 2025-01-27 | Stop reason: HOSPADM

## 2025-01-25 RX ORDER — ONDANSETRON HYDROCHLORIDE 2 MG/ML
4 INJECTION, SOLUTION INTRAVENOUS EVERY 12 HOURS PRN
Status: DISCONTINUED | OUTPATIENT
Start: 2025-01-25 | End: 2025-01-27 | Stop reason: HOSPADM

## 2025-01-25 RX ORDER — LANOLIN ALCOHOL/MO/W.PET/CERES
400 CREAM (GRAM) TOPICAL 2 TIMES DAILY
Status: DISCONTINUED | OUTPATIENT
Start: 2025-01-25 | End: 2025-01-27 | Stop reason: HOSPADM

## 2025-01-25 RX ORDER — ATORVASTATIN CALCIUM 40 MG/1
40 TABLET, FILM COATED ORAL DAILY
Status: DISCONTINUED | OUTPATIENT
Start: 2025-01-25 | End: 2025-01-27 | Stop reason: HOSPADM

## 2025-01-25 RX ORDER — METOPROLOL TARTRATE 50 MG/1
50 TABLET ORAL 2 TIMES DAILY
Status: DISCONTINUED | OUTPATIENT
Start: 2025-01-25 | End: 2025-01-25

## 2025-01-25 RX ORDER — FUROSEMIDE 10 MG/ML
40 INJECTION INTRAMUSCULAR; INTRAVENOUS EVERY 12 HOURS
Status: DISCONTINUED | OUTPATIENT
Start: 2025-01-25 | End: 2025-01-27 | Stop reason: HOSPADM

## 2025-01-25 RX ORDER — METOPROLOL TARTRATE 50 MG/1
100 TABLET ORAL 2 TIMES DAILY
Status: DISCONTINUED | OUTPATIENT
Start: 2025-01-25 | End: 2025-01-27 | Stop reason: HOSPADM

## 2025-01-25 RX ORDER — AZITHROMYCIN 250 MG/1
250 TABLET, FILM COATED ORAL DAILY
Status: DISCONTINUED | OUTPATIENT
Start: 2025-01-25 | End: 2025-01-27 | Stop reason: HOSPADM

## 2025-01-25 RX ORDER — LOSARTAN POTASSIUM 50 MG/1
100 TABLET ORAL DAILY
Status: DISCONTINUED | OUTPATIENT
Start: 2025-01-25 | End: 2025-01-27 | Stop reason: HOSPADM

## 2025-01-25 RX ADMIN — HYDROCHLOROTHIAZIDE 25 MG: 25 TABLET ORAL at 09:01

## 2025-01-25 RX ADMIN — METOPROLOL TARTRATE 50 MG: 50 TABLET, FILM COATED ORAL at 09:01

## 2025-01-25 RX ADMIN — CEFTRIAXONE 1 G: 1 INJECTION, POWDER, FOR SOLUTION INTRAMUSCULAR; INTRAVENOUS at 09:01

## 2025-01-25 RX ADMIN — POTASSIUM CHLORIDE 40 MEQ: 1500 TABLET, EXTENDED RELEASE ORAL at 10:01

## 2025-01-25 RX ADMIN — MAGNESIUM OXIDE TAB 400 MG (241.3 MG ELEMENTAL MG) 400 MG: 400 (241.3 MG) TAB at 10:01

## 2025-01-25 RX ADMIN — AMLODIPINE BESYLATE 10 MG: 10 TABLET ORAL at 08:01

## 2025-01-25 RX ADMIN — AZITHROMYCIN DIHYDRATE 250 MG: 250 TABLET ORAL at 09:01

## 2025-01-25 RX ADMIN — ATORVASTATIN CALCIUM 40 MG: 40 TABLET, FILM COATED ORAL at 09:01

## 2025-01-25 RX ADMIN — MAGNESIUM OXIDE TAB 400 MG (241.3 MG ELEMENTAL MG) 400 MG: 400 (241.3 MG) TAB at 08:01

## 2025-01-25 RX ADMIN — FUROSEMIDE 40 MG: 10 INJECTION, SOLUTION INTRAMUSCULAR; INTRAVENOUS at 11:01

## 2025-01-25 RX ADMIN — LOSARTAN POTASSIUM 100 MG: 50 TABLET, FILM COATED ORAL at 09:01

## 2025-01-25 RX ADMIN — FUROSEMIDE 40 MG: 10 INJECTION, SOLUTION INTRAMUSCULAR; INTRAVENOUS at 09:01

## 2025-01-25 RX ADMIN — METOPROLOL TARTRATE 100 MG: 50 TABLET, FILM COATED ORAL at 08:01

## 2025-01-25 NOTE — HPI
Hima Leija is 89 y.o.  Referred to [facility] for Pulmonary  Bilateral pleural effusinLeft>> right  Request for thoracentsis  H&P reveiwed  Granddaughter at bedised  Has Home O2 and see Local pul provider  Hx Heavy smoking, quit 1996  Worked on oil rigs plants  Denied asbestosis exposure  Anticoagulation in past stopped due to nose bleeds  PMH  has a past medical history of Bleeding from the nose, CHF (congestive heart failure), Coronary artery disease, Diabetes mellitus, [place] (hard of hearing), Hyperlipidemia, Hypertension, Osteoarthritis, PAD (peripheral artery disease), Paroxysmal atrial fibrillation,   Recent worsening dyspnea/shortness a breath both at rest and on exertion.  Patient recently discharged from outside facility following admission for treatment of pneumonia and was found to have new onset atrial fibrillation which was rate controlled at time of discharge on home metoprolol.  Since discharge, patient has had progressively worsening dyspnea/shortness of breath prompting patient to return to ED for further evaluation was found to have evidence of large bilateral pleural effusion with left greater than right upon initial workup.          Patient currently saturating 100% on 4 L via nasal cannula.

## 2025-01-25 NOTE — HPI
Hima Leija is a 89 y.o. male with a PMH  has a past medical history of Bleeding from the nose, CHF (congestive heart failure), Coronary artery disease, Diabetes mellitus, [place] (hard of hearing), Hyperlipidemia, Hypertension, Osteoarthritis, PAD (peripheral artery disease), Paroxysmal atrial fibrillation, and Wears dentures. who presented as a transfer from [facility] for higher level of care and further evaluation by pulmonology for bilateral pleural effusion resulting in dyspnea/shortness a breath both at rest and on exertion.  Patient recently discharged from outside facility following admission for treatment of pneumonia and was found to have new onset atrial fibrillation which was rate controlled at time of discharge on home metoprolol.  Since discharge, patient has had progressively worsening dyspnea/shortness of breath prompting patient to return to ED for further evaluation was found to have evidence of large bilateral pleural effusion with left greater than right upon initial workup.  At time of bedside assessment, patient is sitting up in bed in no acute distress complaining of dyspnea and bilateral lower extremity edema but reported all other review of systems negative in his without any additional concerns or complaints.  Pulmonology consulted and awaiting further evaluation/recommendations regarding therapeutic and diagnostic thoracentesis later today.  Patient currently saturating 100% on 4 L via nasal cannula.  Additional history of present illness as noted below per  provider at time of transfer.    Patient is a 89-year-old male with a past medical history of hypertension, diabetes, atrial fibrillation and recent discharge after treatment for right-sided pneumonia that presents to the emergency department because of shortness of breath.  Patient satting 93% on room air.  Patient hypertensive and tachycardic.  Afebrile.  CT angio of the chest shows large bilateral pleural effusions, left  greater than right.  Patchy airspace opacities in the right upper lobe.  No pulmonary emboli.  Patient given DuoNeb, azithromycin, Rocephin and Lasix.  Patient to be transferred to higher level of care for pulmonology consultation and possible thoracentesis.      PCP: Darci Rooney

## 2025-01-25 NOTE — ASSESSMENT & PLAN NOTE
Patient found to have large pleural effusion on imaging. I have personally reviewed and interpreted the following imaging: CT. A thoracentesis was deferred. Most likely etiology currently unknown however differentials includes  pneumonia vs CHF . Management to include Diuresis.  Pulmonology consulted and awaiting further evaluation/recommendations regarding therapeutic and diagnostic thoracentesis.  Patient currently saturating 100% on 4 L via nasal cannula in no acute distress without use of accessory muscles noted.  Plan:  -Continue home medications, titrate as needed  -Titrate oxygen requirements as needed  -Incentive spirometry   -Monitor pulse oximetry  -Marcio prn  -Continue p.r.n. Lasix  -f/u pulmonology

## 2025-01-25 NOTE — SUBJECTIVE & OBJECTIVE
Past Medical History:   Diagnosis Date    Bleeding from the nose     R/T ASPIRIN USE    CHF (congestive heart failure)     Coronary artery disease     Diabetes mellitus     [place] (hard of hearing)     Hyperlipidemia     Hypertension     Osteoarthritis     PAD (peripheral artery disease)     Paroxysmal atrial fibrillation     Wears dentures     UPPER        Past Surgical History:   Procedure Laterality Date    BACK SURGERY      CARDIAC SURGERY      CATARACT EXTRACTION, BILATERAL      COLONOSCOPY      CORONARY ARTERY BYPASS GRAFT      JOINT REPLACEMENT Left 2019    knee    TOTAL KNEE ARTHROPLASTY Left 2019    Procedure: ARTHROPLASTY, KNEE, TOTAL, left;  Surgeon: ISABEL Gaitan II, MD;  Location: [facility];  Service: Orthopedics;  Laterality: Left;       Review of patient's allergies indicates:  No Known Allergies    Family History       Problem Relation (Age of Onset)    Diabetes Mother    Heart disease Mother    Hypertension Mother          Tobacco Use    Smoking status: Former     Current packs/day: 0.00     Types: Cigarettes     Quit date:      Years since quittin.1    Smokeless tobacco: Never   Substance and Sexual Activity    Alcohol use: Not Currently     Alcohol/week: 1.0 standard drink of alcohol     Types: 1 Glasses of wine per week    Drug use: Never    Sexual activity: Not Currently     Partners: Female         Review of Systems   Constitutional:  Positive for fatigue.   Respiratory:  Positive for shortness of breath.    Cardiovascular:  Positive for leg swelling.     Objective:     Vital Signs (Most Recent):  Temp: 98.1 °F (36.7 °C) (25 07)  Pulse: 60 (25 1120)  Resp: 18 (25)  BP: (!) 172/95 (25 07)  SpO2: 95 % (25 1120) Vital Signs (24h Range):  Temp:  [97.8 °F (36.6 °C)-98.1 °F (36.7 °C)] 98.1 °F (36.7 °C)  Pulse:  [] 60  Resp:  [18-29] 18  SpO2:  [95 %-100 %] 95 %  BP: (148-181)/() 172/95        There is no height or weight on file  to calculate BMI.      Intake/Output Summary (Last 24 hours) at 1/25/2025 1136  Last data filed at 1/25/2025 1131  Gross per 24 hour   Intake 480 ml   Output 300 ml   Net 180 ml        Physical Exam  Vitals and nursing note reviewed.   Constitutional:       Appearance: He is well-developed.   HENT:      Head: Normocephalic and atraumatic.      Nose: Nose normal.   Eyes:      Pupils: Pupils are equal, round, and reactive to light.   Cardiovascular:      Rate and Rhythm: Normal rate and regular rhythm.      Heart sounds: Normal heart sounds.   Pulmonary:      Breath sounds: Decreased air movement present. Examination of the right-lower field reveals decreased breath sounds. Examination of the left-lower field reveals decreased breath sounds. Decreased breath sounds present.   Abdominal:      Palpations: Abdomen is soft.   Musculoskeletal:         General: Swelling present. Normal range of motion.      Cervical back: Normal range of motion and neck supple.   Skin:     General: Skin is warm and dry.      Capillary Refill: Capillary refill takes 2 to 3 seconds.   Neurological:      Mental Status: He is alert and oriented to person, place, and time.      Cranial Nerves: No cranial nerve deficit.          Vents:       Lines/Drains/Airways       Peripheral Intravenous Line  Duration                  Peripheral IV - Single Lumen 01/24/25 0907 18 G Anterior;Left;Proximal Forearm 1 day                    Significant Labs:    CBC/Anemia Profile:  Recent Labs   Lab 01/24/25  0218 01/25/25  0509   WBC 4.70 3.29*   HGB 10.7* 10.1*   HCT 33.8* 32.5*   * 142*   * 103*   RDW 15.9* 16.0*        Chemistries:  Recent Labs   Lab 01/24/25  0015 01/25/25  0509    143   K 3.7 3.3*    100   CO2 29 31*   BUN 35* 27   CREATININE 0.8 0.8   CALCIUM 9.0 8.9   ALBUMIN 3.5 3.0*   PROT 8.2 7.0   BILITOT 1.0 1.0   ALKPHOS 108 95   ALT 19 16   AST 22 20   MG  --  1.6       All pertinent labs within the past 24 hours have been  reviewed.    Significant Imaging:   I have reviewed all pertinent imaging results/findings within the past 24 hours.    US Lower Extremity Veins Bilateral  Narrative: EXAMINATION:  US LOWER EXTREMITY VEINS BILATERAL    CLINICAL HISTORY:  Other specified soft tissue disorders    TECHNIQUE:  Duplex and color flow Doppler and dynamic compression was performed of the bilateral lower extremity veins was performed.    COMPARISON:  None    FINDINGS:  Right thigh veins: The common femoral, femoral, popliteal, upper greater saphenous, and deep femoral veins are patent and free of thrombus. The veins are normally compressible and have normal phasic flow and augmentation response.    Right calf veins: The visualized calf veins are patent.    Left thigh veins: The common femoral, femoral, popliteal, upper greater saphenous, and deep femoral veins are patent and free of thrombus. The veins are normally compressible and have normal phasic flow and augmentation response.    Left calf veins: The visualized calf veins are patent.    Miscellaneous: There is soft tissue edema.  Impression: No evidence of deep venous thrombosis in either lower extremity.    Electronically signed by: Igor Hudson  Date:    01/24/2025  Time:    02:58  CTA Chest Non-Coronary (PE Studies)  Narrative: EXAMINATION:  CTA CHEST NON CORONARY (PE STUDIES)    CLINICAL HISTORY:  Pulmonary embolism (PE) suspected, positive D-dimer;    TECHNIQUE:  Low dose axial images, sagittal and coronal reformations were obtained from the thoracic inlet to the lung bases following the IV administration of 100 mL of Omnipaque 350.  Contrast timing was optimized to evaluate the pulmonary arteries.  MIP images were performed.    COMPARISON:  None    FINDINGS:  Examination degraded by motion.  Good contrast bolus opacification of the pulmonary arteries. No acute pulmonary thromboembolism. No hilar or mediastinal mass or lymphadenopathy. Large bilateral pleural effusions, left  larger than right.  Compressive atelectasis involving bilateral lower lobes as well as the lingula.  Patchy airspace opacities in the right upper lobe.  Limited images through the upper abdomen are unremarkable.  Impression: Examination degraded by motion.    No acute pulmonary thromboemboli.    Large bilateral pleural effusions, left larger than right.    Compressive atelectasis involving bilateral lower lobes as well as the lingula. Patchy airspace opacities in the right upper lobe.    Electronically signed by: Evens Gutierrez MD  Date:    01/24/2025  Time:    02:05

## 2025-01-25 NOTE — ASSESSMENT & PLAN NOTE
Patient has  newly diagnosed  atrial fibrillation last month during previous hospitalization for pneumonia. Patient is currently in sinus rhythm. [id number] Score: 3. The patients heart rate in the last 24 hours is as follows:  Pulse  Min: 91  Max: 110     Antiarrhythmics  -Metoprolol 50 mg b.i.d.    Anticoagulants  -Eliquis 5 mg b.i.d.    Plan  -Replete lytes with a goal of K>4, Mg >2  -Patient is anticoagulated, see medications listed above.  -Patient's afib is currently controlled  -Continued metoprolol  -Hold Eliquis pending potential thoracentesis

## 2025-01-25 NOTE — ASSESSMENT & PLAN NOTE
Chronic, controlled.  Latest blood pressure and vitals reviewed-   Temp:  [97.6 °F (36.4 °C)-98 °F (36.7 °C)]   Pulse:  []   Resp:  [19-29]   BP: (148-181)/()   SpO2:  [93 %-100 %] .   Home meds for hypertension were reviewed and noted below.   Hypertension Medications               amLODIPine (NORVASC) 10 MG tablet Take 1 tablet (10 mg total) by mouth every evening.    hydroCHLOROthiazide (HYDRODIURIL) 25 MG tablet Take 25 mg by mouth once daily.    irbesartan (AVAPRO) 300 MG tablet Take 300 mg by mouth once daily.    metoprolol tartrate (LOPRESSOR) 50 MG tablet Take 1 tablet (50 mg total) by mouth 2 (two) times daily.    nitroGLYCERIN (NITROSTAT) 0.4 MG SL tablet Place 0.4 mg under the tongue every 5 (five) minutes as needed for Chest pain.     While in the hospital, will manage blood pressure as follows; Continue home antihypertensive regimen    Will utilize p.r.n. blood pressure medication only if patient's blood pressure greater than  180/110 and he develops symptoms such as worsening chest pain or shortness of breath.

## 2025-01-25 NOTE — PROGRESS NOTES
ST swallowing evaluation orders received and chart reviewed. 89 yo male admitted with c/o SOB, pleural effusions s/p thoracentesis this afternoon.  Pt sleeping upon arrival--Daughter at bedside reported pt with minimal sleep since admission and requested ST return later for assessment.  She reported improvement in respiratory function following procedure.  She also mentioned chronic dysphagia>solids.  ST will complete bedside CSE in a.m with recommendations to follow.

## 2025-01-25 NOTE — CONSULTS
[facility] - Telemetry ([facility])  Pulmonology  Consult Note    Patient Name: Hima Leija  MRN: [id number]  Admission Date: 1/25/2025  Hospital Length of Stay: 0 days  Code Status: Full Code  Attending Physician: Fredis Yanez MD  Primary Care Provider: Darci Rooney PA   Principal Problem: Shortness of breath    Inpatient consult to Pulmonology  Consult performed by: Talat Koo MD  Consult ordered by: Ac Morfin MD        Subjective:     HPI:  Hima Leija is 89 y.o.  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  Patient recently discharged from outside facility following admission for treatment of pneumonia and was found to have new onset atrial fibrillation which was rate controlled at time of discharge on home metoprolol.  Since discharge, patient has had progressively worsening dyspnea/shortness of breath prompting patient to return to ED for further evaluation was found to have evidence of large bilateral pleural effusion with left greater than right upon initial workup.          Patient currently saturating 100% on 4 L via nasal cannula.          Past Medical History:   Diagnosis Date    Bleeding from the nose     R/T ASPIRIN USE    CHF (congestive heart failure)     Coronary artery disease     Diabetes mellitus     [place] (hard of hearing)     Hyperlipidemia      Hypertension     Osteoarthritis     PAD (peripheral artery disease)     Paroxysmal atrial fibrillation     Wears dentures     UPPER        Past Surgical History:   Procedure Laterality Date    BACK SURGERY      CARDIAC SURGERY      CATARACT EXTRACTION, BILATERAL      COLONOSCOPY      CORONARY ARTERY BYPASS GRAFT      JOINT REPLACEMENT Left 2019    knee    TOTAL KNEE ARTHROPLASTY Left 2019    Procedure: ARTHROPLASTY, KNEE, TOTAL, left;  Surgeon: ISABEL Gaitan II, MD;  Location: [facility];  Service: Orthopedics;  Laterality: Left;       Review of patient's allergies indicates:  No Known Allergies    Family History       Problem Relation (Age of Onset)    Diabetes Mother    Heart disease Mother    Hypertension Mother          Tobacco Use    Smoking status: Former     Current packs/day: 0.00     Types: Cigarettes     Quit date:      Years since quittin.1    Smokeless tobacco: Never   Substance and Sexual Activity    Alcohol use: Not Currently     Alcohol/week: 1.0 standard drink of alcohol     Types: 1 Glasses of wine per week    Drug use: Never    Sexual activity: Not Currently     Partners: Female         Review of Systems   Constitutional:  Positive for fatigue.   Respiratory:  Positive for shortness of breath.    Cardiovascular:  Positive for leg swelling.     Objective:     Vital Signs (Most Recent):  Temp: 98.1 °F (36.7 °C) (25)  Pulse: (!) 113 (25)  Resp: 18 (25)  BP: (!) 172/95 (25)  SpO2: 96 % (25) Vital Signs (24h Range):  Temp:  [97.8 °F (36.6 °C)-98.1 °F (36.7 °C)] 98.1 °F (36.7 °C)  Pulse:  [] 113  Resp:  [18-29] 18  SpO2:  [95 %-100 %] 96 %  BP: (148-181)/() 172/95        There is no height or weight on file to calculate BMI.    No intake or output data in the 24 hours ending 25     Physical Exam  Vitals and nursing note reviewed.   Constitutional:       Appearance: He is well-developed.   HENT:      Head:  Normocephalic and atraumatic.      Nose: Nose normal.   Eyes:      Pupils: Pupils are equal, round, and reactive to light.   Cardiovascular:      Rate and Rhythm: Normal rate and regular rhythm.      Heart sounds: Normal heart sounds.   Pulmonary:      Breath sounds: Decreased air movement present. Examination of the right-lower field reveals decreased breath sounds. Examination of the left-lower field reveals decreased breath sounds. Decreased breath sounds present.   Abdominal:      Palpations: Abdomen is soft.   Musculoskeletal:         General: Swelling present. Normal range of motion.      Cervical back: Normal range of motion and neck supple.   Skin:     General: Skin is warm and dry.      Capillary Refill: Capillary refill takes 2 to 3 seconds.   Neurological:      Mental Status: He is alert and oriented to person, place, and time.      Cranial Nerves: No cranial nerve deficit.          Vents:       Lines/Drains/Airways       Peripheral Intravenous Line  Duration                  Peripheral IV - Single Lumen 01/24/25 0907 18 G Anterior;Left;Proximal Forearm 1 day                    Significant Labs:    CBC/Anemia Profile:  Recent Labs   Lab 01/24/25  0218 01/25/25  0509   WBC 4.70 3.29*   HGB 10.7* 10.1*   HCT 33.8* 32.5*   * 142*   * 103*   RDW 15.9* 16.0*        Chemistries:  Recent Labs   Lab 01/24/25  0015 01/25/25  0509    143   K 3.7 3.3*    100   CO2 29 31*   BUN 35* 27   CREATININE 0.8 0.8   CALCIUM 9.0 8.9   ALBUMIN 3.5 3.0*   PROT 8.2 7.0   BILITOT 1.0 1.0   ALKPHOS 108 95   ALT 19 16   AST 22 20   MG  --  1.6     Component      Latest Ref Rng 1/24/2025 1/25/2025   BNP      0 - 99 pg/mL 222 (H)  236 (H)       Legend:  (H) High  All pertinent labs within the past 24 hours have been reviewed.    Significant Imaging:   I have reviewed all pertinent imaging results/findings within the past 24 hours.    US Lower Extremity Veins Bilateral  Narrative: EXAMINATION:  US LOWER  EXTREMITY VEINS BILATERAL    CLINICAL HISTORY:  Other specified soft tissue disorders    TECHNIQUE:  Duplex and color flow Doppler and dynamic compression was performed of the bilateral lower extremity veins was performed.    COMPARISON:  None    FINDINGS:  Right thigh veins: The common femoral, femoral, popliteal, upper greater saphenous, and deep femoral veins are patent and free of thrombus. The veins are normally compressible and have normal phasic flow and augmentation response.    Right calf veins: The visualized calf veins are patent.    Left thigh veins: The common femoral, femoral, popliteal, upper greater saphenous, and deep femoral veins are patent and free of thrombus. The veins are normally compressible and have normal phasic flow and augmentation response.    Left calf veins: The visualized calf veins are patent.    Miscellaneous: There is soft tissue edema.  Impression: No evidence of deep venous thrombosis in either lower extremity.    Electronically signed by: Igor Hudson  Date:    01/24/2025  Time:    02:58  CTA Chest Non-Coronary (PE Studies)  Narrative: EXAMINATION:  CTA CHEST NON CORONARY (PE STUDIES)    CLINICAL HISTORY:  Pulmonary embolism (PE) suspected, positive D-dimer;    TECHNIQUE:  Low dose axial images, sagittal and coronal reformations were obtained from the thoracic inlet to the lung bases following the IV administration of 100 mL of Omnipaque 350.  Contrast timing was optimized to evaluate the pulmonary arteries.  MIP images were performed.    COMPARISON:  None    FINDINGS:  Examination degraded by motion.  Good contrast bolus opacification of the pulmonary arteries. No acute pulmonary thromboembolism. No hilar or mediastinal mass or lymphadenopathy. Large bilateral pleural effusions, left larger than right.  Compressive atelectasis involving bilateral lower lobes as well as the lingula.  Patchy airspace opacities in the right upper lobe.  Limited images through the upper abdomen  "are unremarkable.  Impression: Examination degraded by motion.    No acute pulmonary thromboemboli.    Large bilateral pleural effusions, left larger than right.    Compressive atelectasis involving bilateral lower lobes as well as the lingula. Patchy airspace opacities in the right upper lobe.    Electronically signed by: Evens Gutierrez MD  Date:    01/24/2025  Time:    02:05       ABG  No results for input(s): "PH", "PO2", "PCO2", "HCO3", "BE" in the last 168 hours.  Assessment/Plan:     Pulmonary  Chronic respiratory failure with hypoxia  Patient with Hypoxic Respiratory failure which is Acute on chronic.  he is on home oxygen at 3 LPM. Supplemental oxygen was provided and noted-      .   Signs/symptoms of respiratory failure include- tachypnea and increased work of breathing. Contributing diagnoses includes - CHF Labs and images were reviewed. Patient Has not had a recent ABG. Will treat underlying causes and adjust management of respiratory failure as follows-      thoracentsis    Opacity of lung on imaging study  RUL  AbxCeftriaxone + Azithro    Bilateral pleural effusion  Patient found to have moderate pleural effusion on imaging. I have personally reviewed and interpreted the following imaging: CT. A thoracentesis was ordered. Most likely etiology includes Congestive Heart Failure. Management to include Diuresis and thoracenteis      Complications of the procedure discussed in detail with patient. Complications including but not limited to infection that may require hospital admission, bleeding that may require blood transfusion and or hospital admission, perforation of the lung which may require surgery. Patient expressed and verbalized understanding. Alternate treatments and material risks associated with such alternatives were discussed with pateint. These include radiologic surveillance with minimal risk and sugery with an indeterminate risk. The material risks of refusing the procedure was discussed in " detail. This includes no diagnosis or confirmation of diagnisis and rendering of appropriate treatment the risk of which depends on the nature of the diagnosed illness. Patient expressed and verbalized understanding. Pleural fluid will be sent for chenistry, microbiology and cytology.     For left satcy today      Thank you for your consult. I will follow-up with patient. Please contact us if you have any additional questions.     Talat Koo MD  Pulmonology  [facility] ([facility])

## 2025-01-25 NOTE — H&P
[facility]  History & Physical    Patient Name: Hima Leija  MRN: [id number]  Patient Class: IP- Inpatient  Admission Date: 1/25/2025  Attending Physician: Fredis Yanez MD   Primary Care Provider: Darci Rooney PA         Patient information was obtained from patient, past medical records, and ER records.     Subjective:     Principal Problem:Shortness of breath    Chief Complaint: No chief complaint on file.       HPI: Hima Leija is a 89 y.o. male with a PMH  has a past medical history of Bleeding from the nose, CHF (congestive heart failure), Coronary artery disease, Diabetes mellitus, [place] (hard of hearing), Hyperlipidemia, Hypertension, Osteoarthritis, PAD (peripheral artery disease), Paroxysmal atrial fibrillation, and Wears dentures. who presented as a transfer from [facility] for higher level of care and further evaluation by pulmonology for bilateral pleural effusion resulting in dyspnea/shortness a breath both at rest and on exertion.  Patient recently discharged from outside facility following admission for treatment of pneumonia and was found to have new onset atrial fibrillation which was rate controlled at time of discharge on home metoprolol.  Since discharge, patient has had progressively worsening dyspnea/shortness of breath prompting patient to return to ED for further evaluation was found to have evidence of large bilateral pleural effusion with left greater than right upon initial workup.  At time of bedside assessment, patient is sitting up in bed in no acute distress complaining of dyspnea and bilateral lower extremity edema but reported all other review of systems negative in his without any additional concerns or complaints.  Pulmonology consulted and awaiting further evaluation/recommendations regarding therapeutic and diagnostic thoracentesis later today.  Patient currently saturating 100% on 4 L via nasal cannula.  Additional history of  present illness as noted below per  provider at time of transfer.    Patient is a 89-year-old male with a past medical history of hypertension, diabetes, atrial fibrillation and recent discharge after treatment for right-sided pneumonia that presents to the emergency department because of shortness of breath.  Patient satting 93% on room air.  Patient hypertensive and tachycardic.  Afebrile.  CT angio of the chest shows large bilateral pleural effusions, left greater than right.  Patchy airspace opacities in the right upper lobe.  No pulmonary emboli.  Patient given DuoNeb, azithromycin, Rocephin and Lasix.  Patient to be transferred to higher level of care for pulmonology consultation and possible thoracentesis.      PCP: Darci Rooney       Past Medical History:   Diagnosis Date    Bleeding from the nose     R/T ASPIRIN USE    CHF (congestive heart failure)     Coronary artery disease     Diabetes mellitus     [place] (hard of hearing)     Hyperlipidemia     Hypertension     Osteoarthritis     PAD (peripheral artery disease)     Paroxysmal atrial fibrillation     Wears dentures     UPPER        Past Surgical History:   Procedure Laterality Date    BACK SURGERY      CARDIAC SURGERY      CATARACT EXTRACTION, BILATERAL      COLONOSCOPY      CORONARY ARTERY BYPASS GRAFT      JOINT REPLACEMENT Left 06/06/2019    knee    TOTAL KNEE ARTHROPLASTY Left 06/06/2019    Procedure: ARTHROPLASTY, KNEE, TOTAL, left;  Surgeon: ISABEL Gaitan II, MD;  Location: [facility];  Service: Orthopedics;  Laterality: Left;       Review of patient's allergies indicates:  No Known Allergies    Current Facility-Administered Medications on File Prior to Encounter   Medication    [COMPLETED] azithromycin (ZITHROMAX) 500 mg in 0.9% NaCl 250 mL IVPB (admixture device)    [COMPLETED] furosemide injection 40 mg     Current Outpatient Medications on File Prior to Encounter   Medication Sig    albuterol-ipratropium (DUO-NEB) 2.5 mg-0.5 mg/3 mL  nebulizer solution Take 3 mLs by nebulization every 6 (six) hours while awake. Rescue    amLODIPine (NORVASC) 10 MG tablet Take 1 tablet (10 mg total) by mouth every evening.    atorvastatin (LIPITOR) 40 MG tablet Take 40 mg by mouth once daily.    cyanocobalamin (VITAMIN B-12) 1000 MCG tablet Take 1,000 mcg by mouth once daily.    gabapentin (NEURONTIN) 100 MG capsule Take 2 capsules (200 mg total) by mouth 2 (two) times a day.    hydroCHLOROthiazide (HYDRODIURIL) 25 MG tablet Take 25 mg by mouth once daily.    irbesartan (AVAPRO) 300 MG tablet Take 300 mg by mouth once daily.    metFORMIN (GLUCOPHAGE-XR) 750 MG ER 24hr tablet Take 500 mg by mouth daily with breakfast.    metoprolol tartrate (LOPRESSOR) 50 MG tablet Take 1 tablet (50 mg total) by mouth 2 (two) times daily.    multivitamin capsule Take 1 capsule by mouth once daily.    nitroGLYCERIN (NITROSTAT) 0.4 MG SL tablet Place 0.4 mg under the tongue every 5 (five) minutes as needed for Chest pain.    RYBELSUS 7 mg tablet Take 7 mg by mouth once daily.     Family History       Problem Relation (Age of Onset)    Diabetes Mother    Heart disease Mother    Hypertension Mother          Tobacco Use    Smoking status: Former     Current packs/day: 0.00     Types: Cigarettes     Quit date:      Years since quittin.1    Smokeless tobacco: Never   Substance and Sexual Activity    Alcohol use: Not Currently     Alcohol/week: 1.0 standard drink of alcohol     Types: 1 Glasses of wine per week    Drug use: Never    Sexual activity: Not Currently     Partners: Female     Review of Systems   All other systems reviewed and are negative.    Objective:     Vital Signs (Most Recent):  Temp: 97.8 °F (36.6 °C) (25)  Pulse: 108 (25)  Resp: 19 (25)  BP: (!) 158/73 (25)  SpO2: 100 % (25) Vital Signs (24h Range):  Temp:  [97.6 °F (36.4 °C)-97.8 °F (36.6 °C)] 97.8 °F (36.6 °C)  Pulse:  [] 108  Resp:  [19-32]  19  SpO2:  [91 %-100 %] 100 %  BP: (141-181)/() 158/73        There is no height or weight on file to calculate BMI.     Physical Exam  Vitals reviewed.   Constitutional:       General: He is not in acute distress.     Appearance: He is normal weight. He is ill-appearing. He is not toxic-appearing or diaphoretic.      Comments: Ill-appearing but nontoxic   HENT:      Head: Normocephalic and atraumatic.      Right Ear: External ear normal.      Left Ear: External ear normal.      Nose: Nose normal. No congestion or rhinorrhea.      Mouth/Throat:      Mouth: Mucous membranes are moist.      Pharynx: Oropharynx is clear. No oropharyngeal exudate or posterior oropharyngeal erythema.   Eyes:      General: No scleral icterus.     Extraocular Movements: Extraocular movements intact.      Conjunctiva/sclera: Conjunctivae normal.      Pupils: Pupils are equal, round, and reactive to light.   Neck:      Vascular: No carotid bruit.   Cardiovascular:      Rate and Rhythm: Regular rhythm. Tachycardia present.      Pulses: Normal pulses.      Heart sounds: Normal heart sounds. No murmur heard.     No friction rub. No gallop.   Pulmonary:      Effort: Pulmonary effort is normal. No respiratory distress.      Breath sounds: No stridor. No wheezing, rhonchi or rales.      Comments: Diminished breath sounds noted throughout bilaterally.  Chest:      Chest wall: No tenderness.   Abdominal:      General: Abdomen is flat. Bowel sounds are normal. There is no distension.      Palpations: Abdomen is soft. There is no mass.      Tenderness: There is no abdominal tenderness. There is no right CVA tenderness, left CVA tenderness, guarding or rebound.      Hernia: No hernia is present.   Musculoskeletal:         General: Swelling present. No tenderness, deformity or signs of injury. Normal range of motion.      Cervical back: Normal range of motion and neck supple. No rigidity or tenderness.      Right lower leg: Edema present.       Left lower leg: Edema present.   Lymphadenopathy:      Cervical: No cervical adenopathy.   Skin:     General: Skin is warm and dry.      Capillary Refill: Capillary refill takes less than 2 seconds.      Coloration: Skin is not jaundiced or pale.      Findings: No bruising, erythema, lesion or rash.   Neurological:      General: No focal deficit present.      Mental Status: He is alert and oriented to person, place, and time. Mental status is at baseline.      Cranial Nerves: No cranial nerve deficit.      Sensory: No sensory deficit.      Motor: No weakness.      Coordination: Coordination normal.   Psychiatric:         Mood and Affect: Mood normal.         Behavior: Behavior normal.         Thought Content: Thought content normal.         Judgment: Judgment normal.              CRANIAL NERVES     CN III, IV, VI   Pupils are equal, round, and reactive to light.       Significant Labs: All pertinent labs within the past 24 hours have been reviewed.    Significant Imaging: I have reviewed all pertinent imaging results/findings within the past 24 hours.    LABS:  No results found for this or any previous visit (from the past 24 hours).    RADIOLOGY  US Lower Extremity Veins Bilateral    Result Date: 1/24/2025  EXAMINATION: US LOWER EXTREMITY VEINS BILATERAL CLINICAL HISTORY: Other specified soft tissue disorders TECHNIQUE: Duplex and color flow Doppler and dynamic compression was performed of the bilateral lower extremity veins was performed. COMPARISON: None FINDINGS: Right thigh veins: The common femoral, femoral, popliteal, upper greater saphenous, and deep femoral veins are patent and free of thrombus. The veins are normally compressible and have normal phasic flow and augmentation response. Right calf veins: The visualized calf veins are patent. Left thigh veins: The common femoral, femoral, popliteal, upper greater saphenous, and deep femoral veins are patent and free of thrombus. The veins are normally  compressible and have normal phasic flow and augmentation response. Left calf veins: The visualized calf veins are patent. Miscellaneous: There is soft tissue edema.     No evidence of deep venous thrombosis in either lower extremity. Electronically signed by: Igor Hudson Date:    01/24/2025 Time:    02:58    CTA Chest Non-Coronary (PE Studies)    Result Date: 1/24/2025  EXAMINATION: CTA CHEST NON CORONARY (PE STUDIES) CLINICAL HISTORY: Pulmonary embolism (PE) suspected, positive D-dimer; TECHNIQUE: Low dose axial images, sagittal and coronal reformations were obtained from the thoracic inlet to the lung bases following the IV administration of 100 mL of Omnipaque 350.  Contrast timing was optimized to evaluate the pulmonary arteries.  MIP images were performed. COMPARISON: None FINDINGS: Examination degraded by motion. Good contrast bolus opacification of the pulmonary arteries. No acute pulmonary thromboembolism. No hilar or mediastinal mass or lymphadenopathy. Large bilateral pleural effusions, left larger than right.  Compressive atelectasis involving bilateral lower lobes as well as the lingula.  Patchy airspace opacities in the right upper lobe. Limited images through the upper abdomen are unremarkable.     Examination degraded by motion. No acute pulmonary thromboemboli. Large bilateral pleural effusions, left larger than right. Compressive atelectasis involving bilateral lower lobes as well as the lingula. Patchy airspace opacities in the right upper lobe. Electronically signed by: Evens Gutierrez MD Date:    01/24/2025 Time:    02:05      EKG    MICROBIOLOGY    [facility]    Assessment/Plan:     * Shortness of breath    Bilateral pleural effusion  Patient found to have large pleural effusion on imaging. I have personally reviewed and interpreted the following imaging: CT. A thoracentesis was deferred. Most likely etiology currently unknown however differentials includes  pneumonia vs CHF . Management to include  Diuresis.  Pulmonology consulted and awaiting further evaluation/recommendations regarding therapeutic and diagnostic thoracentesis.  Patient currently saturating 100% on 4 L via nasal cannula in no acute distress without use of accessory muscles noted.  Plan:  -Continue home medications, titrate as needed  -Titrate oxygen requirements as needed  -Incentive spirometry   -Monitor pulse oximetry  -Marcio prn  -f/u echo  -Continue p.r.n. Lasix  -f/u pulmonology       Hypertension  Chronic, controlled.  Latest blood pressure and vitals reviewed-   Temp:  [97.6 °F (36.4 °C)-98 °F (36.7 °C)]   Pulse:  []   Resp:  [19-29]   BP: (148-181)/()   SpO2:  [93 %-100 %] .   Home meds for hypertension were reviewed and noted below.   Hypertension Medications               amLODIPine (NORVASC) 10 MG tablet Take 1 tablet (10 mg total) by mouth every evening.    hydroCHLOROthiazide (HYDRODIURIL) 25 MG tablet Take 25 mg by mouth once daily.    irbesartan (AVAPRO) 300 MG tablet Take 300 mg by mouth once daily.    metoprolol tartrate (LOPRESSOR) 50 MG tablet Take 1 tablet (50 mg total) by mouth 2 (two) times daily.    nitroGLYCERIN (NITROSTAT) 0.4 MG SL tablet Place 0.4 mg under the tongue every 5 (five) minutes as needed for Chest pain.     While in the hospital, will manage blood pressure as follows; Continue home antihypertensive regimen    Will utilize p.r.n. blood pressure medication only if patient's blood pressure greater than  180/110 and he develops symptoms such as worsening chest pain or shortness of breath.      Atrial fibrillation  Patient has  newly diagnosed  atrial fibrillation last month during previous hospitalization for pneumonia. Patient is currently in sinus rhythm. [id number] Score: 3. The patients heart rate in the last 24 hours is as follows:  Pulse  Min: 91  Max: 110     Antiarrhythmics  -Metoprolol 50 mg b.i.d.    Anticoagulants  -Eliquis 5 mg b.i.d.    Plan  -Replete lytes with a goal of K>4, Mg  ">2  -Patient is anticoagulated, see medications listed above.  -Patient's afib is currently controlled  -Continued metoprolol  -Hold Eliquis pending potential thoracentesis      CAD (coronary artery disease)  Patient with known CAD s/p CABG, which is controlled Will continue  continue home medications, currently holding antiplatelet/anticoagulation therapies pending potential thoracentesis.  and monitor for S/Sx of angina/ACS. Continue to monitor on telemetry.       Type 2 diabetes mellitus, without long-term current use of insulin  Patient's FSGs are controlled on current medication regimen.  Last A1c reviewed-   Lab Results   Component Value Date    HGBA1C 6.5 (H) 12/29/2023     Most recent fingerstick glucose reviewed- No results for input(s): "POCTGLUCOSE" in the last 24 hours.  Current correctional scale  Low  Titrate as needed  anti-hyperglycemic dose as follows-   Antihyperglycemics (From admission, onward)     Plan:  -SSI  -A1c  -Accu-checks  -Hold oral hypoglycemics while patient is in the hospital  -Hypoglycemic protocol        Neuropathy  Currently on gabapentin 200 mg b.i.d..  Plan:  -continue home medication      HLD (hyperlipidemia)  Patient is chronically on statin.will continue for now. Last Lipid Panel: No results found for: "CHOL", "HDL", "LDLCALC", "TRIG", "CHOLHDL"  Plan:  -Continue home medication  -low fat/low calorie diet        VTE Risk Mitigation (From admission, onward)           Ordered     Reason for No Pharmacological VTE Prophylaxis  Once        Question:  Reasons:  Answer:  Physician Provided (leave comment)  Comment:  Pending procedure    01/25/25 0147     IP VTE HIGH RISK PATIENT  Once         01/25/25 0147     Place sequential compression device  Until discontinued         01/25/25 0147                  //Core Measures   -DVT proph: SCDs, withholding anticoagulation pending surgical intervention   -Code status: full    -Surrogate: none provided       Components of this note were " documented using a voice recognition system and are subject to errors not corrected at the time the document was proof read. Please contact the author for any clarifications.        Ac Morfin MD  Department of Hospital Medicine  [facility] - Telemetry ([facility])

## 2025-01-25 NOTE — CONSULTS
[facility] - Telemetry ([facility])  Pulmonology  Consult Note    Patient Name: Hima Leija  MRN: [id number]  Admission Date: 1/25/2025  Hospital Length of Stay: 0 days  Code Status: Full Code  Attending Physician: Fredis Yanez MD  Primary Care Provider: Darci Rooney PA   Principal Problem: Shortness of breath    Consults  Subjective:     HPI:  Hima Leija is 89 y.o.  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  Patient recently discharged from outside facility following admission for treatment of pneumonia and was found to have new onset atrial fibrillation which was rate controlled at time of discharge on home metoprolol.  Since discharge, patient has had progressively worsening dyspnea/shortness of breath prompting patient to return to ED for further evaluation was found to have evidence of large bilateral pleural effusion with left greater than right upon initial workup.          Patient currently saturating 100% on 4 L via nasal cannula.          Past Medical History:   Diagnosis Date    Bleeding from the nose     R/T ASPIRIN USE    CHF (congestive heart failure)     Coronary artery disease     Diabetes mellitus     [place] (hard of hearing)     Hyperlipidemia     Hypertension     Osteoarthritis     PAD (peripheral artery disease)     Paroxysmal atrial fibrillation     Wears dentures     UPPER         Past Surgical History:   Procedure Laterality Date    BACK SURGERY      CARDIAC SURGERY      CATARACT EXTRACTION, BILATERAL      COLONOSCOPY      CORONARY ARTERY BYPASS GRAFT      JOINT REPLACEMENT Left 2019    knee    TOTAL KNEE ARTHROPLASTY Left 2019    Procedure: ARTHROPLASTY, KNEE, TOTAL, left;  Surgeon: ISABEL Gaitan II, MD;  Location: [facility];  Service: Orthopedics;  Laterality: Left;       Review of patient's allergies indicates:  No Known Allergies    Family History       Problem Relation (Age of Onset)    Diabetes Mother    Heart disease Mother    Hypertension Mother          Tobacco Use    Smoking status: Former     Current packs/day: 0.00     Types: Cigarettes     Quit date:      Years since quittin.1    Smokeless tobacco: Never   Substance and Sexual Activity    Alcohol use: Not Currently     Alcohol/week: 1.0 standard drink of alcohol     Types: 1 Glasses of wine per week    Drug use: Never    Sexual activity: Not Currently     Partners: Female         Review of Systems   Constitutional:  Positive for fatigue.   Respiratory:  Positive for shortness of breath.    Cardiovascular:  Positive for leg swelling.     Objective:     Vital Signs (Most Recent):  Temp: 98.1 °F (36.7 °C) (25 07)  Pulse: 60 (25 1120)  Resp: 18 (25 07)  BP: (!) 172/95 (25 07)  SpO2: 95 % (25 1120) Vital Signs (24h Range):  Temp:  [97.8 °F (36.6 °C)-98.1 °F (36.7 °C)] 98.1 °F (36.7 °C)  Pulse:  [] 60  Resp:  [18-29] 18  SpO2:  [95 %-100 %] 95 %  BP: (148-181)/() 172/95        There is no height or weight on file to calculate BMI.      Intake/Output Summary (Last 24 hours) at 2025 1136  Last data filed at 2025 1131  Gross per 24 hour   Intake 480 ml   Output 300 ml   Net 180 ml        Physical Exam  Vitals and nursing note reviewed.   Constitutional:       Appearance: He is well-developed.   HENT:      Head: Normocephalic and atraumatic.       Nose: Nose normal.   Eyes:      Pupils: Pupils are equal, round, and reactive to light.   Cardiovascular:      Rate and Rhythm: Normal rate and regular rhythm.      Heart sounds: Normal heart sounds.   Pulmonary:      Breath sounds: Decreased air movement present. Examination of the right-lower field reveals decreased breath sounds. Examination of the left-lower field reveals decreased breath sounds. Decreased breath sounds present.   Abdominal:      Palpations: Abdomen is soft.   Musculoskeletal:         General: Swelling present. Normal range of motion.      Cervical back: Normal range of motion and neck supple.   Skin:     General: Skin is warm and dry.      Capillary Refill: Capillary refill takes 2 to 3 seconds.   Neurological:      Mental Status: He is alert and oriented to person, place, and time.      Cranial Nerves: No cranial nerve deficit.          Vents:       Lines/Drains/Airways       Peripheral Intravenous Line  Duration                  Peripheral IV - Single Lumen 01/24/25 0907 18 G Anterior;Left;Proximal Forearm 1 day                    Significant Labs:    CBC/Anemia Profile:  Recent Labs   Lab 01/24/25  0218 01/25/25  0509   WBC 4.70 3.29*   HGB 10.7* 10.1*   HCT 33.8* 32.5*   * 142*   * 103*   RDW 15.9* 16.0*        Chemistries:  Recent Labs   Lab 01/24/25  0015 01/25/25  0509    143   K 3.7 3.3*    100   CO2 29 31*   BUN 35* 27   CREATININE 0.8 0.8   CALCIUM 9.0 8.9   ALBUMIN 3.5 3.0*   PROT 8.2 7.0   BILITOT 1.0 1.0   ALKPHOS 108 95   ALT 19 16   AST 22 20   MG  --  1.6       All pertinent labs within the past 24 hours have been reviewed.    Significant Imaging:   I have reviewed all pertinent imaging results/findings within the past 24 hours.    US Lower Extremity Veins Bilateral  Narrative: EXAMINATION:  US LOWER EXTREMITY VEINS BILATERAL    CLINICAL HISTORY:  Other specified soft tissue disorders    TECHNIQUE:  Duplex and color flow Doppler and dynamic compression  was performed of the bilateral lower extremity veins was performed.    COMPARISON:  None    FINDINGS:  Right thigh veins: The common femoral, femoral, popliteal, upper greater saphenous, and deep femoral veins are patent and free of thrombus. The veins are normally compressible and have normal phasic flow and augmentation response.    Right calf veins: The visualized calf veins are patent.    Left thigh veins: The common femoral, femoral, popliteal, upper greater saphenous, and deep femoral veins are patent and free of thrombus. The veins are normally compressible and have normal phasic flow and augmentation response.    Left calf veins: The visualized calf veins are patent.    Miscellaneous: There is soft tissue edema.  Impression: No evidence of deep venous thrombosis in either lower extremity.    Electronically signed by: Igor Hudson  Date:    01/24/2025  Time:    02:58  CTA Chest Non-Coronary (PE Studies)  Narrative: EXAMINATION:  CTA CHEST NON CORONARY (PE STUDIES)    CLINICAL HISTORY:  Pulmonary embolism (PE) suspected, positive D-dimer;    TECHNIQUE:  Low dose axial images, sagittal and coronal reformations were obtained from the thoracic inlet to the lung bases following the IV administration of 100 mL of Omnipaque 350.  Contrast timing was optimized to evaluate the pulmonary arteries.  MIP images were performed.    COMPARISON:  None    FINDINGS:  Examination degraded by motion.  Good contrast bolus opacification of the pulmonary arteries. No acute pulmonary thromboembolism. No hilar or mediastinal mass or lymphadenopathy. Large bilateral pleural effusions, left larger than right.  Compressive atelectasis involving bilateral lower lobes as well as the lingula.  Patchy airspace opacities in the right upper lobe.  Limited images through the upper abdomen are unremarkable.  Impression: Examination degraded by motion.    No acute pulmonary thromboemboli.    Large bilateral pleural effusions, left larger than  "right.    Compressive atelectasis involving bilateral lower lobes as well as the lingula. Patchy airspace opacities in the right upper lobe.    Electronically signed by: Evens Gutierrez MD  Date:    01/24/2025  Time:    02:05       ABG  No results for input(s): "PH", "PO2", "PCO2", "HCO3", "BE" in the last 168 hours.  Assessment/Plan:     Pulmonary  Chronic respiratory failure with hypoxia  Patient with Hypoxic Respiratory failure which is Acute on chronic.  he is on home oxygen at 3 LPM. Supplemental oxygen was provided and noted-      .   Signs/symptoms of respiratory failure include- tachypnea and increased work of breathing. Contributing diagnoses includes - CHF Labs and images were reviewed. Patient Has not had a recent ABG. Will treat underlying causes and adjust management of respiratory failure as follows-      thoracentsis    Opacity of lung on imaging study  RUL  AbxCeftriaxone + Azithro    Complications of the procedure discussed in detail with patient. Complications including but not limited to infection that may require hospital admission, bleeding that may require blood transfusion and or hospital admission, perforation of the lung which may require surgery. Patient expressed and verbalized understanding. Alternate treatments and material risks associated with such alternatives were discussed with pateint. These include radiologic surveillance with minimal risk and sugery with an indeterminate risk. The material risks of refusing the procedure was discussed in detail. This includes no diagnosis or confirmation of diagnisis and rendering of appropriate treatment the risk of which depends on the nature of the diagnosed illness. Patient expressed and verbalized understanding. Pleural fluid will be sent for chenistry, microbiology and cytology.       Thank you for your consult. I will follow-up with patient. Please contact us if you have any additional questions.     Talat Koo, " MD  Pulmonology  [facility] - Telemetry ([facility])

## 2025-01-25 NOTE — SUBJECTIVE & OBJECTIVE
Past Medical History:   Diagnosis Date    Bleeding from the nose     R/T ASPIRIN USE    CHF (congestive heart failure)     Coronary artery disease     Diabetes mellitus     [place] (hard of hearing)     Hyperlipidemia     Hypertension     Osteoarthritis     PAD (peripheral artery disease)     Paroxysmal atrial fibrillation     Wears dentures     UPPER        Past Surgical History:   Procedure Laterality Date    BACK SURGERY      CARDIAC SURGERY      CATARACT EXTRACTION, BILATERAL      COLONOSCOPY      CORONARY ARTERY BYPASS GRAFT      JOINT REPLACEMENT Left 06/06/2019    knee    TOTAL KNEE ARTHROPLASTY Left 06/06/2019    Procedure: ARTHROPLASTY, KNEE, TOTAL, left;  Surgeon: ISABEL Gaitan II, MD;  Location: [facility];  Service: Orthopedics;  Laterality: Left;       Review of patient's allergies indicates:  No Known Allergies    Current Facility-Administered Medications on File Prior to Encounter   Medication    [COMPLETED] azithromycin (ZITHROMAX) 500 mg in 0.9% NaCl 250 mL IVPB (admixture device)    [COMPLETED] furosemide injection 40 mg     Current Outpatient Medications on File Prior to Encounter   Medication Sig    albuterol-ipratropium (DUO-NEB) 2.5 mg-0.5 mg/3 mL nebulizer solution Take 3 mLs by nebulization every 6 (six) hours while awake. Rescue    amLODIPine (NORVASC) 10 MG tablet Take 1 tablet (10 mg total) by mouth every evening.    atorvastatin (LIPITOR) 40 MG tablet Take 40 mg by mouth once daily.    cyanocobalamin (VITAMIN B-12) 1000 MCG tablet Take 1,000 mcg by mouth once daily.    gabapentin (NEURONTIN) 100 MG capsule Take 2 capsules (200 mg total) by mouth 2 (two) times a day.    hydroCHLOROthiazide (HYDRODIURIL) 25 MG tablet Take 25 mg by mouth once daily.    irbesartan (AVAPRO) 300 MG tablet Take 300 mg by mouth once daily.    metFORMIN (GLUCOPHAGE-XR) 750 MG ER 24hr tablet Take 500 mg by mouth daily with breakfast.    metoprolol tartrate (LOPRESSOR) 50 MG tablet Take 1 tablet (50 mg total) by mouth  2 (two) times daily.    multivitamin capsule Take 1 capsule by mouth once daily.    nitroGLYCERIN (NITROSTAT) 0.4 MG SL tablet Place 0.4 mg under the tongue every 5 (five) minutes as needed for Chest pain.    RYBELSUS 7 mg tablet Take 7 mg by mouth once daily.     Family History       Problem Relation (Age of Onset)    Diabetes Mother    Heart disease Mother    Hypertension Mother          Tobacco Use    Smoking status: Former     Current packs/day: 0.00     Types: Cigarettes     Quit date:      Years since quittin.1    Smokeless tobacco: Never   Substance and Sexual Activity    Alcohol use: Not Currently     Alcohol/week: 1.0 standard drink of alcohol     Types: 1 Glasses of wine per week    Drug use: Never    Sexual activity: Not Currently     Partners: Female     Review of Systems   All other systems reviewed and are negative.    Objective:     Vital Signs (Most Recent):  Temp: 97.8 °F (36.6 °C) (25)  Pulse: 108 (25)  Resp: 19 (25)  BP: (!) 158/73 (25)  SpO2: 100 % (25) Vital Signs (24h Range):  Temp:  [97.6 °F (36.4 °C)-97.8 °F (36.6 °C)] 97.8 °F (36.6 °C)  Pulse:  [] 108  Resp:  [19-32] 19  SpO2:  [91 %-100 %] 100 %  BP: (141-181)/() 158/73        There is no height or weight on file to calculate BMI.     Physical Exam  Vitals reviewed.   Constitutional:       General: He is not in acute distress.     Appearance: He is normal weight. He is ill-appearing. He is not toxic-appearing or diaphoretic.      Comments: Ill-appearing but nontoxic   HENT:      Head: Normocephalic and atraumatic.      Right Ear: External ear normal.      Left Ear: External ear normal.      Nose: Nose normal. No congestion or rhinorrhea.      Mouth/Throat:      Mouth: Mucous membranes are moist.      Pharynx: Oropharynx is clear. No oropharyngeal exudate or posterior oropharyngeal erythema.   Eyes:      General: No scleral icterus.     Extraocular Movements:  Extraocular movements intact.      Conjunctiva/sclera: Conjunctivae normal.      Pupils: Pupils are equal, round, and reactive to light.   Neck:      Vascular: No carotid bruit.   Cardiovascular:      Rate and Rhythm: Regular rhythm. Tachycardia present.      Pulses: Normal pulses.      Heart sounds: Normal heart sounds. No murmur heard.     No friction rub. No gallop.   Pulmonary:      Effort: Pulmonary effort is normal. No respiratory distress.      Breath sounds: No stridor. No wheezing, rhonchi or rales.      Comments: Diminished breath sounds noted throughout bilaterally.  Chest:      Chest wall: No tenderness.   Abdominal:      General: Abdomen is flat. Bowel sounds are normal. There is no distension.      Palpations: Abdomen is soft. There is no mass.      Tenderness: There is no abdominal tenderness. There is no right CVA tenderness, left CVA tenderness, guarding or rebound.      Hernia: No hernia is present.   Musculoskeletal:         General: Swelling present. No tenderness, deformity or signs of injury. Normal range of motion.      Cervical back: Normal range of motion and neck supple. No rigidity or tenderness.      Right lower leg: Edema present.      Left lower leg: Edema present.   Lymphadenopathy:      Cervical: No cervical adenopathy.   Skin:     General: Skin is warm and dry.      Capillary Refill: Capillary refill takes less than 2 seconds.      Coloration: Skin is not jaundiced or pale.      Findings: No bruising, erythema, lesion or rash.   Neurological:      General: No focal deficit present.      Mental Status: He is alert and oriented to person, place, and time. Mental status is at baseline.      Cranial Nerves: No cranial nerve deficit.      Sensory: No sensory deficit.      Motor: No weakness.      Coordination: Coordination normal.   Psychiatric:         Mood and Affect: Mood normal.         Behavior: Behavior normal.         Thought Content: Thought content normal.         Judgment:  Judgment normal.              CRANIAL NERVES     CN III, IV, VI   Pupils are equal, round, and reactive to light.       Significant Labs: All pertinent labs within the past 24 hours have been reviewed.    Significant Imaging: I have reviewed all pertinent imaging results/findings within the past 24 hours.    LABS:  No results found for this or any previous visit (from the past 24 hours).    RADIOLOGY  US Lower Extremity Veins Bilateral    Result Date: 1/24/2025  EXAMINATION: US LOWER EXTREMITY VEINS BILATERAL CLINICAL HISTORY: Other specified soft tissue disorders TECHNIQUE: Duplex and color flow Doppler and dynamic compression was performed of the bilateral lower extremity veins was performed. COMPARISON: None FINDINGS: Right thigh veins: The common femoral, femoral, popliteal, upper greater saphenous, and deep femoral veins are patent and free of thrombus. The veins are normally compressible and have normal phasic flow and augmentation response. Right calf veins: The visualized calf veins are patent. Left thigh veins: The common femoral, femoral, popliteal, upper greater saphenous, and deep femoral veins are patent and free of thrombus. The veins are normally compressible and have normal phasic flow and augmentation response. Left calf veins: The visualized calf veins are patent. Miscellaneous: There is soft tissue edema.     No evidence of deep venous thrombosis in either lower extremity. Electronically signed by: Igor Hudson Date:    01/24/2025 Time:    02:58    CTA Chest Non-Coronary (PE Studies)    Result Date: 1/24/2025  EXAMINATION: CTA CHEST NON CORONARY (PE STUDIES) CLINICAL HISTORY: Pulmonary embolism (PE) suspected, positive D-dimer; TECHNIQUE: Low dose axial images, sagittal and coronal reformations were obtained from the thoracic inlet to the lung bases following the IV administration of 100 mL of Omnipaque 350.  Contrast timing was optimized to evaluate the pulmonary arteries.  MIP images were  performed. COMPARISON: None FINDINGS: Examination degraded by motion. Good contrast bolus opacification of the pulmonary arteries. No acute pulmonary thromboembolism. No hilar or mediastinal mass or lymphadenopathy. Large bilateral pleural effusions, left larger than right.  Compressive atelectasis involving bilateral lower lobes as well as the lingula.  Patchy airspace opacities in the right upper lobe. Limited images through the upper abdomen are unremarkable.     Examination degraded by motion. No acute pulmonary thromboemboli. Large bilateral pleural effusions, left larger than right. Compressive atelectasis involving bilateral lower lobes as well as the lingula. Patchy airspace opacities in the right upper lobe. Electronically signed by: Evens Gutierrez MD Date:    01/24/2025 Time:    02:05      EKG    MICROBIOLOGY    MDM

## 2025-01-25 NOTE — SUBJECTIVE & OBJECTIVE
Past Medical History:   Diagnosis Date    Bleeding from the nose     R/T ASPIRIN USE    CHF (congestive heart failure)     Coronary artery disease     Diabetes mellitus     [place] (hard of hearing)     Hyperlipidemia     Hypertension     Osteoarthritis     PAD (peripheral artery disease)     Paroxysmal atrial fibrillation     Wears dentures     UPPER        Past Surgical History:   Procedure Laterality Date    BACK SURGERY      CARDIAC SURGERY      CATARACT EXTRACTION, BILATERAL      COLONOSCOPY      CORONARY ARTERY BYPASS GRAFT      JOINT REPLACEMENT Left 2019    knee    TOTAL KNEE ARTHROPLASTY Left 2019    Procedure: ARTHROPLASTY, KNEE, TOTAL, left;  Surgeon: ISABEL Gaitan II, MD;  [facility];  Service: Orthopedics;  Laterality: Left;       Review of patient's allergies indicates:  No Known Allergies    Family History       Problem Relation (Age of Onset)    Diabetes Mother    Heart disease Mother    Hypertension Mother          Tobacco Use    Smoking status: Former     Current packs/day: 0.00     Types: Cigarettes     Quit date:      Years since quittin.1    Smokeless tobacco: Never   Substance and Sexual Activity    Alcohol use: Not Currently     Alcohol/week: 1.0 standard drink of alcohol     Types: 1 Glasses of wine per week    Drug use: Never    Sexual activity: Not Currently     Partners: Female         Review of Systems   Constitutional:  Positive for fatigue.   Respiratory:  Positive for shortness of breath.    Cardiovascular:  Positive for leg swelling.     Objective:     Vital Signs (Most Recent):  Temp: 98.1 °F (36.7 °C) (25)  Pulse: (!) 113 (25)  Resp: 18 (25)  BP: (!) 172/95 (25)  SpO2: 96 % (25) Vital Signs (24h Range):  Temp:  [97.8 °F (36.6 °C)-98.1 °F (36.7 °C)] 98.1 °F (36.7 °C)  Pulse:  [] 113  Resp:  [18-29] 18  SpO2:  [95 %-100 %] 96 %  BP: (148-181)/() 172/95        There is no height or weight on  file to calculate BMI.    No intake or output data in the 24 hours ending 01/25/25 0951     Physical Exam  Vitals and nursing note reviewed.   Constitutional:       Appearance: He is well-developed.   HENT:      Head: Normocephalic and atraumatic.      Nose: Nose normal.   Eyes:      Pupils: Pupils are equal, round, and reactive to light.   Cardiovascular:      Rate and Rhythm: Normal rate and regular rhythm.      Heart sounds: Normal heart sounds.   Pulmonary:      Breath sounds: Decreased air movement present. Examination of the right-lower field reveals decreased breath sounds. Examination of the left-lower field reveals decreased breath sounds. Decreased breath sounds present.   Abdominal:      Palpations: Abdomen is soft.   Musculoskeletal:         General: Swelling present. Normal range of motion.      Cervical back: Normal range of motion and neck supple.   Skin:     General: Skin is warm and dry.      Capillary Refill: Capillary refill takes 2 to 3 seconds.   Neurological:      Mental Status: He is alert and oriented to person, place, and time.      Cranial Nerves: No cranial nerve deficit.          Vents:       Lines/Drains/Airways       Peripheral Intravenous Line  Duration                  Peripheral IV - Single Lumen 01/24/25 0907 18 G Anterior;Left;Proximal Forearm 1 day                    Significant Labs:    CBC/Anemia Profile:  Recent Labs   Lab 01/24/25  0218 01/25/25  0509   WBC 4.70 3.29*   HGB 10.7* 10.1*   HCT 33.8* 32.5*   * 142*   * 103*   RDW 15.9* 16.0*        Chemistries:  Recent Labs   Lab 01/24/25  0015 01/25/25  0509    143   K 3.7 3.3*    100   CO2 29 31*   BUN 35* 27   CREATININE 0.8 0.8   CALCIUM 9.0 8.9   ALBUMIN 3.5 3.0*   PROT 8.2 7.0   BILITOT 1.0 1.0   ALKPHOS 108 95   ALT 19 16   AST 22 20   MG  --  1.6     Component      Latest Ref Rng 1/24/2025 1/25/2025   BNP      0 - 99 pg/mL 222 (H)  236 (H)       Legend:  (H) High  All pertinent labs within the  past 24 hours have been reviewed.    Significant Imaging:   I have reviewed all pertinent imaging results/findings within the past 24 hours.    US Lower Extremity Veins Bilateral  Narrative: EXAMINATION:  US LOWER EXTREMITY VEINS BILATERAL    CLINICAL HISTORY:  Other specified soft tissue disorders    TECHNIQUE:  Duplex and color flow Doppler and dynamic compression was performed of the bilateral lower extremity veins was performed.    COMPARISON:  None    FINDINGS:  Right thigh veins: The common femoral, femoral, popliteal, upper greater saphenous, and deep femoral veins are patent and free of thrombus. The veins are normally compressible and have normal phasic flow and augmentation response.    Right calf veins: The visualized calf veins are patent.    Left thigh veins: The common femoral, femoral, popliteal, upper greater saphenous, and deep femoral veins are patent and free of thrombus. The veins are normally compressible and have normal phasic flow and augmentation response.    Left calf veins: The visualized calf veins are patent.    Miscellaneous: There is soft tissue edema.  Impression: No evidence of deep venous thrombosis in either lower extremity.    Electronically signed by: Igor Hudson  Date:    01/24/2025  Time:    02:58  CTA Chest Non-Coronary (PE Studies)  Narrative: EXAMINATION:  CTA CHEST NON CORONARY (PE STUDIES)    CLINICAL HISTORY:  Pulmonary embolism (PE) suspected, positive D-dimer;    TECHNIQUE:  Low dose axial images, sagittal and coronal reformations were obtained from the thoracic inlet to the lung bases following the IV administration of 100 mL of Omnipaque 350.  Contrast timing was optimized to evaluate the pulmonary arteries.  MIP images were performed.    COMPARISON:  None    FINDINGS:  Examination degraded by motion.  Good contrast bolus opacification of the pulmonary arteries. No acute pulmonary thromboembolism. No hilar or mediastinal mass or lymphadenopathy. Large bilateral  pleural effusions, left larger than right.  Compressive atelectasis involving bilateral lower lobes as well as the lingula.  Patchy airspace opacities in the right upper lobe.  Limited images through the upper abdomen are unremarkable.  Impression: Examination degraded by motion.    No acute pulmonary thromboemboli.    Large bilateral pleural effusions, left larger than right.    Compressive atelectasis involving bilateral lower lobes as well as the lingula. Patchy airspace opacities in the right upper lobe.    Electronically signed by: Evens Gutierrez MD  Date:    01/24/2025  Time:    02:05

## 2025-01-25 NOTE — ASSESSMENT & PLAN NOTE
Patient has Diastolic (HFpEF) heart failure that is Acute on chronic. On presentation their CHF was decompensated. Evidence of decompensated CHF on presentation includes: edema. The etiology of their decompensation is likely dietary indiscretion. Most recent BNP and echo results are listed below.  Recent Labs     01/24/25  0218 01/25/25  0509   * 236*     Latest ECHO  No results found for this or any previous visit.    Current Heart Failure Medications  losartan tablet 100 mg, Daily, Oral  furosemide injection 40 mg, Every 12 hours, Intravenous    Plan  - Monitor strict I&Os and daily weights.    - Place on telemetry  - Low sodium diet  - Place on fluid restriction of 1.5 L.   - Cardiology has been consulted  - The patient's volume status is improving but not at their baseline as indicated by edema    - increase lasix to 40 mg bid ivp and d/c HCTZ  - increase lopressor to 100 mg bid  - continue amlodipine and losartan

## 2025-01-25 NOTE — CONSULTS
[facility] ([facility])  Cardiology  Consult Note    Patient Name: Hima Leija  MRN: [id number]  Admission Date: 1/25/2025  Hospital Length of Stay: 0 days  Code Status: Full Code   Attending Provider: Fredis Yanez MD   Consulting Provider: Tee Redd MD  Primary Care Physician: Darci Rooney PA  Principal Problem:Shortness of breath    Patient information was obtained from patient and ER records.     Inpatient consult to Cardiology  Consult performed by: Tee Redd MD  Consult ordered by: Ac oMrfin MD        Subjective:       HPI:   Hima Leija is a 89 y.o. male with HTN, dm 2, Afib, recent hospital admission for right sided pneumonia discharged on home O2 presenting with chief complaint of shortness of breath.  Patient presents to the ER accompanied by his daughter.  He reports that he was discharged after 7 day admission for pneumonia and was told to use oxygen p.r.n..  Recently he was weaned up to 4 L nasal cannula continuously about 1 week ago.  Patient has had continued and worsening shortness of breath during this time.  He has also been having worsening bilateral lower extremity edema.  Patient has had lower extremity edema before but never to this extent.  Denies chest pain.     Cardiology cnsult for CHF exacerbation.  PMHx: CAD s/p CABG, HTN, HLD, PAD, DM type 2, afib Dx in 12/23 when admitted for PNA.  C/o SOB orthopnea and leg swelling    Troponin 0.013  Cr 0.8 HGB 10.1 and , UA -  EKG AFIB VR  LE venous US showed no DVT  Echo in 12/23 EF 45%  Chest ct showed bilateral large pleural effusion L > R and patchy infiltrate         Past Medical History:   Diagnosis Date    Bleeding from the nose     R/T ASPIRIN USE    CHF (congestive heart failure)     Coronary artery disease     Diabetes mellitus     [place] (hard of hearing)     Hyperlipidemia     Hypertension     Osteoarthritis     PAD (peripheral artery disease)     Paroxysmal atrial fibrillation     Wears  dentures     UPPER        Past Surgical History:   Procedure Laterality Date    BACK SURGERY      CARDIAC SURGERY      CATARACT EXTRACTION, BILATERAL      COLONOSCOPY      CORONARY ARTERY BYPASS GRAFT      JOINT REPLACEMENT Left 06/06/2019    knee    TOTAL KNEE ARTHROPLASTY Left 06/06/2019    Procedure: ARTHROPLASTY, KNEE, TOTAL, left;  Surgeon: ISABEL Gaitan II, MD;  Location: [facility];  Service: Orthopedics;  Laterality: Left;       Review of patient's allergies indicates:  No Known Allergies    No current facility-administered medications on file prior to encounter.     Current Outpatient Medications on File Prior to Encounter   Medication Sig    albuterol-ipratropium (DUO-NEB) 2.5 mg-0.5 mg/3 mL nebulizer solution Take 3 mLs by nebulization every 6 (six) hours while awake. Rescue    amLODIPine (NORVASC) 10 MG tablet Take 1 tablet (10 mg total) by mouth every evening.    atorvastatin (LIPITOR) 40 MG tablet Take 40 mg by mouth once daily.    cyanocobalamin (VITAMIN B-12) 1000 MCG tablet Take 1,000 mcg by mouth once daily.    gabapentin (NEURONTIN) 100 MG capsule Take 2 capsules (200 mg total) by mouth 2 (two) times a day.    hydroCHLOROthiazide (HYDRODIURIL) 25 MG tablet Take 25 mg by mouth once daily.    irbesartan (AVAPRO) 300 MG tablet Take 300 mg by mouth once daily.    metFORMIN (GLUCOPHAGE-XR) 750 MG ER 24hr tablet Take 500 mg by mouth daily with breakfast.    metoprolol tartrate (LOPRESSOR) 50 MG tablet Take 1 tablet (50 mg total) by mouth 2 (two) times daily.    multivitamin capsule Take 1 capsule by mouth once daily.    nitroGLYCERIN (NITROSTAT) 0.4 MG SL tablet Place 0.4 mg under the tongue every 5 (five) minutes as needed for Chest pain.    RYBELSUS 7 mg tablet Take 7 mg by mouth once daily.     Family History       Problem Relation (Age of Onset)    Diabetes Mother    Heart disease Mother    Hypertension Mother          Tobacco Use    Smoking status: Former     Current packs/day: 0.00     Types:  Cigarettes     Quit date: 1966     Years since quittin.1    Smokeless tobacco: Never   Substance and Sexual Activity    Alcohol use: Not Currently     Alcohol/week: 1.0 standard drink of alcohol     Types: 1 Glasses of wine per week    Drug use: Never    Sexual activity: Not Currently     Partners: Female     Review of Systems   Constitutional: Positive for malaise/fatigue. Negative for decreased appetite, diaphoresis, fever and night sweats.   HENT:  Negative for nosebleeds.    Eyes:  Negative for blurred vision and double vision.   Cardiovascular:  Positive for dyspnea on exertion, irregular heartbeat, leg swelling and orthopnea. Negative for chest pain, claudication, near-syncope, palpitations, paroxysmal nocturnal dyspnea and syncope.   Respiratory:  Positive for shortness of breath. Negative for cough, sleep disturbances due to breathing, snoring, sputum production and wheezing.    Endocrine: Negative for cold intolerance and polyuria.   Hematologic/Lymphatic: Does not bruise/bleed easily.   Skin:  Negative for rash.   Musculoskeletal:  Negative for back pain, falls, joint pain, joint swelling and neck pain.   Gastrointestinal:  Negative for abdominal pain, heartburn, nausea and vomiting.   Genitourinary:  Negative for dysuria, frequency and hematuria.   Neurological:  Negative for difficulty with concentration, dizziness, focal weakness, headaches, light-headedness, numbness, seizures and weakness.   Psychiatric/Behavioral:  Negative for depression, memory loss and substance abuse. The patient does not have insomnia.    Allergic/Immunologic: Negative for HIV exposure and hives.     Objective:     Vital Signs (Most Recent):  Temp: 98.1 °F (36.7 °C) (25)  Pulse: (!) 113 (25)  Resp: 18 (25)  BP: (!) 172/95 (25)  SpO2: 96 % (25) Vital Signs (24h Range):  Temp:  [97.8 °F (36.6 °C)-98.1 °F (36.7 °C)] 98.1 °F (36.7 °C)  Pulse:  [] 113  Resp:  [18-29]  "18  SpO2:  [95 %-100 %] 96 %  BP: (148-181)/() 172/95        There is no height or weight on file to calculate BMI.    SpO2: 96 %         Intake/Output Summary (Last 24 hours) at 1/25/2025 1100  Last data filed at 1/25/2025 0952  Gross per 24 hour   Intake 240 ml   Output 300 ml   Net -60 ml       Lines/Drains/Airways       Peripheral Intravenous Line  Duration                  Peripheral IV - Single Lumen 01/24/25 0907 18 G Anterior;Left;Proximal Forearm 1 day                     Physical Exam  HENT:      Head: Normocephalic.   Eyes:      Pupils: Pupils are equal, round, and reactive to light.   Neck:      Thyroid: No thyromegaly.      Vascular: Normal carotid pulses. No carotid bruit or JVD.   Cardiovascular:      Rate and Rhythm: Normal rate. Rhythm irregularly irregular. No extrasystoles are present.     Chest Wall: PMI is not displaced.      Pulses: Normal pulses.      Heart sounds: Normal heart sounds. No murmur heard.     No gallop. No S3 sounds.   Pulmonary:      Effort: No respiratory distress.      Breath sounds: No stridor. Decreased breath sounds present.   Abdominal:      General: Bowel sounds are normal.      Palpations: Abdomen is soft.      Tenderness: There is no abdominal tenderness. There is no rebound.   Musculoskeletal:         General: Swelling present.   Skin:     General: Skin is warm and dry.      Findings: No rash.   Neurological:      Mental Status: He is alert and oriented to person, place, and time.   Psychiatric:         Behavior: Behavior normal.          Significant Labs: ABG: No results for input(s): "PH", "PCO2", "HCO3", "POCSATURATED", "BE" in the last 48 hours., Blood Culture:   Recent Labs   Lab 01/24/25  0015 01/24/25  0021   LABBLOO No Growth to date No Growth to date   , BMP:   Recent Labs   Lab 01/24/25  0015 01/25/25  0509   * 162*    143   K 3.7 3.3*    100   CO2 29 31*   BUN 35* 27   CREATININE 0.8 0.8   CALCIUM 9.0 8.9   MG  --  1.6   , CMP " "  Recent Labs   Lab 01/24/25  0015 01/25/25  0509    143   K 3.7 3.3*    100   CO2 29 31*   * 162*   BUN 35* 27   CREATININE 0.8 0.8   CALCIUM 9.0 8.9   PROT 8.2 7.0   ALBUMIN 3.5 3.0*   BILITOT 1.0 1.0   ALKPHOS 108 95   AST 22 20   ALT 19 16   ANIONGAP 11 12   , CBC   Recent Labs   Lab 01/24/25  0218 01/25/25  0509   WBC 4.70 3.29*   HGB 10.7* 10.1*   HCT 33.8* 32.5*   * 142*   , INR No results for input(s): "INR", "PROTIME" in the last 48 hours., Lipid Panel No results for input(s): "CHOL", "HDL", "LDLCALC", "TRIG", "CHOLHDL" in the last 48 hours., and Troponin No results for input(s): "TROPONINIHS" in the last 48 hours.    Significant Imaging: Echocardiogram: 2D echo with color flow doppler:   Results for orders placed or performed during the hospital encounter of 12/29/23   2D echo with color flow doppler    Narrative           Transthoracic Echocardiogram Report    Patient Name  : [place] [address]    Phone: [phone]    Interpreting Physician: JOSE MANUEL CONTRERAS MD  Demographics:  Name:  BENSON JIMENEZ   YOB: 1933  Age/Sex:  89, Male   Height: 5 ft 8 in  Weight:   181 pounds    BSA: 2 cc/m?  BMI:      27.5   Date of Study: 12/29/2023  Medical Record No:   [id number]   Location: [id number]  Referring Physician:   ALEJANDRO ALY   Technologist: Juliette Meléndez  Study:   Trans Thoracic Echocardiogram  Study Quality:   Average  Procedure  Type: Adult TTE (Date Study Ordered : 12/29/2023)  Components: 2D,Color Flow Doppler,Doppler,M-mode,TDI(Tissue Doppler   Imaging)   Referral diagnosis  New onset Atrial Fibrillation  Hemodynamics  Heart rate: 85 beats/min  Blood pressure:  157/91 mmHg  ECG:     Final Impressions  1. The study quality is average.   2. Global left ventricular systolic function is mildly decreased. The   left ventricular ejection fraction is 45 - 50%.    3. The left atrium is mildly " enlarged.  4. The right atrium is mildly enlarged.   5. Trace mitral regurgitation.  6. The inferior vena cava is moderately increased in size. The   inferior vena cava changes less than 50 percent during respiration.    7. The pulmonary artery systolic pressure is 37 mmHg.     Findings    Left Atrium  The left atrium is mildly enlarged. Left atrial diameter is 4.4 cms.     Right Atrium  The right atrium is mildly enlarged. Right atrial diameter is 4.6 cms.     Left Ventricle  The left ventricle is normal in size. Left ventricular diastolic   dimension is 4.2 cms. Left ventricular systolic dimension is 3.2 cms.   Left ventricular diastolic septal thickness is 1.3 cms. Left   ventricular diastolic postero basal free wall thickness is 1.3 cms.   Global left ventricular systolic function is mildly decreased. The   left ventricular fractional shortening is 23.8%. The left ventricular   ejection fraction is 48%. Left ventricular outflow tract diameter is 2   cms. Left ventricular outflow tract VTI is 8.8 cms. Left ventricular   outflow tract mean velocity is 0.3 m/s. Left ventricular mean gradient   is 1 mmHg.      Right Ventricle  Right ventricular diastolic dimension is 4.2 cms. Right ventricular   systolic pressure is 37 mmHg.      Atrial Septum  The atrial septum is normal.     Ventricular Septum  The ventricular septum is normal.     Pulmonary Artery  The pulmonary artery systolic pressure is 37 mmHg.     Pulmonary Vein  The Pulmonary vein was not clearly visualized.    IVC  The inferior vena cava is moderately increased in size. The inferior   vena cava changes less than 50 percent during respiration.      Pulmonic Valve  Evidence of pulmonic regurgitation is noted. Mild to moderate (1-2+)   pulmonic regurgitation. The mean velocity is 0.7 m/s. The mean trans   pulmonic gradient is 2 mmHg.      Tricuspid Valve  Evidence of tricuspid regurgitation is present. Mild to moderate   (1-2+) tricuspid regurgitation. The  peak tricuspid regurgitant   velocity is 2.6 m/s. The peak trans tricuspid gradient is 27 mmHg.      Mitral Valve  Mitral regurgitation is noted. Trace mitral regurgitation. The   pressure half time is 44 ms. The deceleration time is 151 ms. The E   velocity is 1.1 m/s. The A velocity is 0.6 m/s.      Aortic Valve  The aortic valve is tricuspid. The trans-aortic peak velocity is 1.1   m/s. The trans-aortic peak gradient is 5.1 mmHg. The trans-aortic mean   velocity is 0.8 m/s. The trans-aortic mean gradient is 3 mmHg. Aortic   valve VTI measures 21.5 cms. Mild calcification of the aortic valve is   noted with adequate cuspal excursion.  LVOT Diameter is 2 cms.      Aorta  Aortic root diameter is 3 cms. Ascending aorta diameter is 4 cms. The   aortic root appears mildly dilated.     Pericardium  A moderate pleural effusion is noted.     Measurements  Left Atrium  Diameter   4.4cm(s)  Volume   59.3ml  Volume Index   29.6ml/m?    Right Atrium  Diameter   4.6cm(s)    Left Ventricle  End Diastolic Dimension   4.2cm(s)  End Systolic Dimension   3.2cm(s)  Posterior Basal Free Wall Thickness   1.3cm(s)  End Diastolic Septal Thickness   1.3cm(s)  Ejection Fraction   48%  Fractional Shortening   23.8    Right Ventricle  Diastolic Diameter   4.2cm(s)    Pulmonic Valve  Mean Pulmonic Velocity   0.7m/s  Mean Trans Pulmonic Gradient   2mmHg    Tricuspid Valve  Peak Tricuspid Regurgitant Velocity   2.6m/s  Peak Tricuspid Regurgitant Gradient   27mmHg  Pulmonary Artery Systolic Pressure  37mmHg    Mitral Valve  Pressure Half Time   44ms  Deceleration Time   151ms  A Velocity   0.6m/s  E Velocity   1.1m/s  Area By Pressure Half Time   5cm?    Aortic Valve  Trans Aortic Peak Velocity   1.1m/s  Trans Aortic Mean Velocity   0.8m/s  Trans Aortic Peak Gradient   5.1mmHg  Trans Aortic Mean Gradient   3mmHg  Aortic Valve VTI   21.5cm(s)  LVOT Diameter   2cm(s)        Electronically Authenticated by  JOSE MANUEL CONTRERAS MD  12/29/2023 ,  14:41      and EKG:   Assessment and Plan:     Acute on chronic diastolic congestive heart failure  Patient has Diastolic (HFpEF) heart failure that is Acute on chronic. On presentation their CHF was decompensated. Evidence of decompensated CHF on presentation includes: edema. The etiology of their decompensation is likely dietary indiscretion. Most recent BNP and echo results are listed below.  Recent Labs     01/24/25  0218 01/25/25  0509   * 236*     Latest ECHO  No results found for this or any previous visit.    Current Heart Failure Medications  losartan tablet 100 mg, Daily, Oral  furosemide injection 40 mg, Every 12 hours, Intravenous    Plan  - Monitor strict I&Os and daily weights.    - Place on telemetry  - Low sodium diet  - Place on fluid restriction of 1.5 L.   - Cardiology has been consulted  - The patient's volume status is improving but not at their baseline as indicated by edema    - increase lasix to 40 mg bid ivp and d/c HCTZ  - increase lopressor to 100 mg bid  - continue amlodipine and losartan        Atrial fibrillation  AFIB with RVR    - increase Lopressor to 100 mg bid  - advise to START HEPARIN GTT IF NO HIGH BLEEDING RISK    CAD (coronary artery disease)  CONTINUE STATIN    Bilateral pleural effusion  Rx per     Type 2 diabetes mellitus, without long-term current use of insulin  RX PER         VTE Risk Mitigation (From admission, onward)           Ordered     Reason for No Pharmacological VTE Prophylaxis  Once        Question:  Reasons:  Answer:  Physician Provided (leave comment)  Comment:  Pending procedure    01/25/25 0147     IP VTE HIGH RISK PATIENT  Once         01/25/25 0147     Place sequential compression device  Until discontinued         01/25/25 0147                    Thank you for your consult. I will follow-up with patient. Please contact us if you have any additional questions.    Tee Redd MD  Cardiology   [facility] ([facility])

## 2025-01-25 NOTE — PLAN OF CARE
A238/A238 JIMBOJayesh Leija is a 89 y.o.male admitted on 1/25/2025 for Shortness of breath   Code Status: Full Code MRN: [id number]   Review of patient's allergies indicates:  No Known Allergies  Past Medical History:   Diagnosis Date    Bleeding from the nose     R/T ASPIRIN USE    CHF (congestive heart failure)     Coronary artery disease     Diabetes mellitus     [place] (hard of hearing)     Hyperlipidemia     Hypertension     Osteoarthritis     PAD (peripheral artery disease)     Paroxysmal atrial fibrillation     Wears dentures     UPPER       PRN meds    acetaminophen, 650 mg, Q8H PRN  acetaminophen, 650 mg, Q4H PRN  ondansetron, 4 mg, Q12H PRN  polyethylene glycol, 17 g, Daily PRN  promethazine, 25 mg, Q6H PRN  sodium chloride 0.9%, 10 mL, PRN      Chart check completed. Will continue plan of care.                         VTE Core Measure: (SCDs) Sequential compression device initiated/maintained Last Bowel Movement: 01/24/25  Diet Low Sodium, 2gm Fluid - 1500mL     Bashir Score: 19  Fall Risk Score: 18  Accucheck []   Freq?      Lines/Drains/Airways       None

## 2025-01-25 NOTE — ASSESSMENT & PLAN NOTE
Patient found to have moderate pleural effusion on imaging. I have personally reviewed and interpreted the following imaging: CT. A thoracentesis was ordered. Most likely etiology includes Congestive Heart Failure. Management to include Diuresis and thoracenteis

## 2025-01-25 NOTE — ASSESSMENT & PLAN NOTE
Patient with Hypoxic Respiratory failure which is Acute on chronic.  he is on home oxygen at 3 LPM. Supplemental oxygen was provided and noted-      .   Signs/symptoms of respiratory failure include- tachypnea and increased work of breathing. Contributing diagnoses includes - CHF Labs and images were reviewed. Patient Has not had a recent ABG. Will treat underlying causes and adjust management of respiratory failure as follows-      thoracentsis

## 2025-01-25 NOTE — PLAN OF CARE
A238/A238 JIMBOJayesh Leija is a 89 y.o.male admitted on 1/25/2025 for Shortness of breath   Code Status: Full Code MRN: [id number]   Review of patient's allergies indicates:  No Known Allergies  Past Medical History:   Diagnosis Date    Bleeding from the nose     R/T ASPIRIN USE    CHF (congestive heart failure)     Coronary artery disease     Diabetes mellitus     [place] (hard of hearing)     Hyperlipidemia     Hypertension     Osteoarthritis     PAD (peripheral artery disease)     Paroxysmal atrial fibrillation     Wears dentures     UPPER       PRN meds    acetaminophen, 650 mg, Q8H PRN  acetaminophen, 650 mg, Q4H PRN  ondansetron, 4 mg, Q12H PRN  polyethylene glycol, 17 g, Daily PRN  promethazine, 25 mg, Q6H PRN  sodium chloride 0.9%, 10 mL, PRN  sodium chloride 0.9%, 10 mL, PRN      Chart check completed. Will continue plan of care.         Rani Coma Scale Score: 15     Lead Monitored: Lead II Rhythm: atrial rhythm    Cardiac/Telemetry Box Number: [id number]  VTE Core Measure: (TEDs) Compression stocking therapy initiated/maintained Last Bowel Movement: 01/24/25  Diet Cardiac Minced & Moist (IDDSI Level 5); Fluid - 1500mL  Voiding Characteristics: voids spontaneously without difficulty  Bashir Score: 16  Fall Risk Score: 16  Accucheck []   Freq?      Lines/Drains/Airways       Peripheral Intravenous Line  Duration                  Peripheral IV - Single Lumen 01/24/25 0907 18 G Anterior;Left;Proximal Forearm 1 day

## 2025-01-25 NOTE — ASSESSMENT & PLAN NOTE
AFIB with RVR    - increase Lopressor to 100 mg bid  - advise to START HEPARIN GTT IF NO HIGH BLEEDING RISK

## 2025-01-25 NOTE — SUBJECTIVE & OBJECTIVE
Past Medical History:   Diagnosis Date    Bleeding from the nose     R/T ASPIRIN USE    CHF (congestive heart failure)     Coronary artery disease     Diabetes mellitus     [place] (hard of hearing)     Hyperlipidemia     Hypertension     Osteoarthritis     PAD (peripheral artery disease)     Paroxysmal atrial fibrillation     Wears dentures     UPPER        Past Surgical History:   Procedure Laterality Date    BACK SURGERY      CARDIAC SURGERY      CATARACT EXTRACTION, BILATERAL      COLONOSCOPY      CORONARY ARTERY BYPASS GRAFT      JOINT REPLACEMENT Left 06/06/2019    knee    TOTAL KNEE ARTHROPLASTY Left 06/06/2019    Procedure: ARTHROPLASTY, KNEE, TOTAL, left;  Surgeon: ISABEL Gaitan II, MD;  Location: [facility];  Service: Orthopedics;  Laterality: Left;       Review of patient's allergies indicates:  No Known Allergies    No current facility-administered medications on file prior to encounter.     Current Outpatient Medications on File Prior to Encounter   Medication Sig    albuterol-ipratropium (DUO-NEB) 2.5 mg-0.5 mg/3 mL nebulizer solution Take 3 mLs by nebulization every 6 (six) hours while awake. Rescue    amLODIPine (NORVASC) 10 MG tablet Take 1 tablet (10 mg total) by mouth every evening.    atorvastatin (LIPITOR) 40 MG tablet Take 40 mg by mouth once daily.    cyanocobalamin (VITAMIN B-12) 1000 MCG tablet Take 1,000 mcg by mouth once daily.    gabapentin (NEURONTIN) 100 MG capsule Take 2 capsules (200 mg total) by mouth 2 (two) times a day.    hydroCHLOROthiazide (HYDRODIURIL) 25 MG tablet Take 25 mg by mouth once daily.    irbesartan (AVAPRO) 300 MG tablet Take 300 mg by mouth once daily.    metFORMIN (GLUCOPHAGE-XR) 750 MG ER 24hr tablet Take 500 mg by mouth daily with breakfast.    metoprolol tartrate (LOPRESSOR) 50 MG tablet Take 1 tablet (50 mg total) by mouth 2 (two) times daily.    multivitamin capsule Take 1 capsule by mouth once daily.    nitroGLYCERIN (NITROSTAT) 0.4 MG SL tablet Place 0.4 mg  under the tongue every 5 (five) minutes as needed for Chest pain.    RYBELSUS 7 mg tablet Take 7 mg by mouth once daily.     Family History       Problem Relation (Age of Onset)    Diabetes Mother    Heart disease Mother    Hypertension Mother          Tobacco Use    Smoking status: Former     Current packs/day: 0.00     Types: Cigarettes     Quit date:      Years since quittin.1    Smokeless tobacco: Never   Substance and Sexual Activity    Alcohol use: Not Currently     Alcohol/week: 1.0 standard drink of alcohol     Types: 1 Glasses of wine per week    Drug use: Never    Sexual activity: Not Currently     Partners: Female     Review of Systems   Constitutional: Positive for malaise/fatigue. Negative for decreased appetite, diaphoresis, fever and night sweats.   HENT:  Negative for nosebleeds.    Eyes:  Negative for blurred vision and double vision.   Cardiovascular:  Positive for dyspnea on exertion, irregular heartbeat, leg swelling and orthopnea. Negative for chest pain, claudication, near-syncope, palpitations, paroxysmal nocturnal dyspnea and syncope.   Respiratory:  Positive for shortness of breath. Negative for cough, sleep disturbances due to breathing, snoring, sputum production and wheezing.    Endocrine: Negative for cold intolerance and polyuria.   Hematologic/Lymphatic: Does not bruise/bleed easily.   Skin:  Negative for rash.   Musculoskeletal:  Negative for back pain, falls, joint pain, joint swelling and neck pain.   Gastrointestinal:  Negative for abdominal pain, heartburn, nausea and vomiting.   Genitourinary:  Negative for dysuria, frequency and hematuria.   Neurological:  Negative for difficulty with concentration, dizziness, focal weakness, headaches, light-headedness, numbness, seizures and weakness.   Psychiatric/Behavioral:  Negative for depression, memory loss and substance abuse. The patient does not have insomnia.    Allergic/Immunologic: Negative for HIV exposure and hives.  "    Objective:     Vital Signs (Most Recent):  Temp: 98.1 °F (36.7 °C) (01/25/25 0726)  Pulse: (!) 113 (01/25/25 0926)  Resp: 18 (01/25/25 0726)  BP: (!) 172/95 (01/25/25 0726)  SpO2: 96 % (01/25/25 0726) Vital Signs (24h Range):  Temp:  [97.8 °F (36.6 °C)-98.1 °F (36.7 °C)] 98.1 °F (36.7 °C)  Pulse:  [] 113  Resp:  [18-29] 18  SpO2:  [95 %-100 %] 96 %  BP: (148-181)/() 172/95        There is no height or weight on file to calculate BMI.    SpO2: 96 %         Intake/Output Summary (Last 24 hours) at 1/25/2025 1100  Last data filed at 1/25/2025 0952  Gross per 24 hour   Intake 240 ml   Output 300 ml   Net -60 ml       Lines/Drains/Airways       Peripheral Intravenous Line  Duration                  Peripheral IV - Single Lumen 01/24/25 0907 18 G Anterior;Left;Proximal Forearm 1 day                     Physical Exam  HENT:      Head: Normocephalic.   Eyes:      Pupils: Pupils are equal, round, and reactive to light.   Neck:      Thyroid: No thyromegaly.      Vascular: Normal carotid pulses. No carotid bruit or JVD.   Cardiovascular:      Rate and Rhythm: Normal rate. Rhythm irregularly irregular. No extrasystoles are present.     Chest Wall: PMI is not displaced.      Pulses: Normal pulses.      Heart sounds: Normal heart sounds. No murmur heard.     No gallop. No S3 sounds.   Pulmonary:      Effort: No respiratory distress.      Breath sounds: No stridor. Decreased breath sounds present.   Abdominal:      General: Bowel sounds are normal.      Palpations: Abdomen is soft.      Tenderness: There is no abdominal tenderness. There is no rebound.   Musculoskeletal:         General: Swelling present.   Skin:     General: Skin is warm and dry.      Findings: No rash.   Neurological:      Mental Status: He is alert and oriented to person, place, and time.   Psychiatric:         Behavior: Behavior normal.          Significant Labs: ABG: No results for input(s): "PH", "PCO2", "HCO3", "POCSATURATED", "BE" in the " "last 48 hours., Blood Culture:   Recent Labs   Lab 01/24/25  0015 01/24/25  0021   LABBLOO No Growth to date No Growth to date   , BMP:   Recent Labs   Lab 01/24/25  0015 01/25/25  0509   * 162*    143   K 3.7 3.3*    100   CO2 29 31*   BUN 35* 27   CREATININE 0.8 0.8   CALCIUM 9.0 8.9   MG  --  1.6   , CMP   Recent Labs   Lab 01/24/25  0015 01/25/25  0509    143   K 3.7 3.3*    100   CO2 29 31*   * 162*   BUN 35* 27   CREATININE 0.8 0.8   CALCIUM 9.0 8.9   PROT 8.2 7.0   ALBUMIN 3.5 3.0*   BILITOT 1.0 1.0   ALKPHOS 108 95   AST 22 20   ALT 19 16   ANIONGAP 11 12   , CBC   Recent Labs   Lab 01/24/25  0218 01/25/25  0509   WBC 4.70 3.29*   HGB 10.7* 10.1*   HCT 33.8* 32.5*   * 142*   , INR No results for input(s): "INR", "PROTIME" in the last 48 hours., Lipid Panel No results for input(s): "CHOL", "HDL", "LDLCALC", "TRIG", "CHOLHDL" in the last 48 hours., and Troponin No results for input(s): "TROPONINIHS" in the last 48 hours.    Significant Imaging: Echocardiogram: 2D echo with color flow doppler:   Results for orders placed or performed during the hospital encounter of 12/29/23   2D echo with color flow doppler    Narrative           Transthoracic Echocardiogram Report    Patient Name  : BENSON JIMENEZ  [address] [address]    Phone: [phone]    Interpreting Physician: JOSE MANUEL CONTRERAS MD  Demographics:  Name:  BENSON JIMENEZ   YOB: 1933  Age/Sex:  89, Male   Height: 5 ft 8 in  Weight:   181 pounds    BSA: 2 cc/m?  BMI:      27.5   Date of Study: 12/29/2023  Medical Record No:   [id number]   Location: [id number]  Referring Physician:   ALEJANDRO ALY   Technologist: Juliette Meléndez  Study:   Trans Thoracic Echocardiogram  Study Quality:   Average  Procedure  Type: Adult TTE (Date Study Ordered : 12/29/2023)  Components: 2D,Color Flow Doppler,Doppler,M-mode,TDI(Tissue Doppler   Imaging)   Referral " diagnosis  New onset Atrial Fibrillation  Hemodynamics  Heart rate: 85 beats/min  Blood pressure:  157/91 mmHg  ECG:     Final Impressions  1. The study quality is average.   2. Global left ventricular systolic function is mildly decreased. The   left ventricular ejection fraction is 45 - 50%.    3. The left atrium is mildly enlarged.  4. The right atrium is mildly enlarged.   5. Trace mitral regurgitation.  6. The inferior vena cava is moderately increased in size. The   inferior vena cava changes less than 50 percent during respiration.    7. The pulmonary artery systolic pressure is 37 mmHg.     Findings    Left Atrium  The left atrium is mildly enlarged. Left atrial diameter is 4.4 cms.     Right Atrium  The right atrium is mildly enlarged. Right atrial diameter is 4.6 cms.     Left Ventricle  The left ventricle is normal in size. Left ventricular diastolic   dimension is 4.2 cms. Left ventricular systolic dimension is 3.2 cms.   Left ventricular diastolic septal thickness is 1.3 cms. Left   ventricular diastolic postero basal free wall thickness is 1.3 cms.   Global left ventricular systolic function is mildly decreased. The   left ventricular fractional shortening is 23.8%. The left ventricular   ejection fraction is 48%. Left ventricular outflow tract diameter is 2   cms. Left ventricular outflow tract VTI is 8.8 cms. Left ventricular   outflow tract mean velocity is 0.3 m/s. Left ventricular mean gradient   is 1 mmHg.      Right Ventricle  Right ventricular diastolic dimension is 4.2 cms. Right ventricular   systolic pressure is 37 mmHg.      Atrial Septum  The atrial septum is normal.     Ventricular Septum  The ventricular septum is normal.     Pulmonary Artery  The pulmonary artery systolic pressure is 37 mmHg.     Pulmonary Vein  The Pulmonary vein was not clearly visualized.    IVC  The inferior vena cava is moderately increased in size. The inferior   vena cava changes less than 50 percent during  respiration.      Pulmonic Valve  Evidence of pulmonic regurgitation is noted. Mild to moderate (1-2+)   pulmonic regurgitation. The mean velocity is 0.7 m/s. The mean trans   pulmonic gradient is 2 mmHg.      Tricuspid Valve  Evidence of tricuspid regurgitation is present. Mild to moderate   (1-2+) tricuspid regurgitation. The peak tricuspid regurgitant   velocity is 2.6 m/s. The peak trans tricuspid gradient is 27 mmHg.      Mitral Valve  Mitral regurgitation is noted. Trace mitral regurgitation. The   pressure half time is 44 ms. The deceleration time is 151 ms. The E   velocity is 1.1 m/s. The A velocity is 0.6 m/s.      Aortic Valve  The aortic valve is tricuspid. The trans-aortic peak velocity is 1.1   m/s. The trans-aortic peak gradient is 5.1 mmHg. The trans-aortic mean   velocity is 0.8 m/s. The trans-aortic mean gradient is 3 mmHg. Aortic   valve VTI measures 21.5 cms. Mild calcification of the aortic valve is   noted with adequate cuspal excursion.  LVOT Diameter is 2 cms.      Aorta  Aortic root diameter is 3 cms. Ascending aorta diameter is 4 cms. The   aortic root appears mildly dilated.     Pericardium  A moderate pleural effusion is noted.     Measurements  Left Atrium  Diameter   4.4cm(s)  Volume   59.3ml  Volume Index   29.6ml/m?    Right Atrium  Diameter   4.6cm(s)    Left Ventricle  End Diastolic Dimension   4.2cm(s)  End Systolic Dimension   3.2cm(s)  Posterior Basal Free Wall Thickness   1.3cm(s)  End Diastolic Septal Thickness   1.3cm(s)  Ejection Fraction   48%  Fractional Shortening   23.8    Right Ventricle  Diastolic Diameter   4.2cm(s)    Pulmonic Valve  Mean Pulmonic Velocity   0.7m/s  Mean Trans Pulmonic Gradient   2mmHg    Tricuspid Valve  Peak Tricuspid Regurgitant Velocity   2.6m/s  Peak Tricuspid Regurgitant Gradient   27mmHg  Pulmonary Artery Systolic Pressure  37mmHg    Mitral Valve  Pressure Half Time   44ms  Deceleration Time   151ms  A Velocity   0.6m/s  E Velocity    1.1m/s  Area By Pressure Half Time   5cm?    Aortic Valve  Trans Aortic Peak Velocity   1.1m/s  Trans Aortic Mean Velocity   0.8m/s  Trans Aortic Peak Gradient   5.1mmHg  Trans Aortic Mean Gradient   3mmHg  Aortic Valve VTI   21.5cm(s)  LVOT Diameter   2cm(s)        Electronically Authenticated by  JOSE MANUEL CONTRERAS MD  12/29/2023 , 14:41      and EKG:

## 2025-01-25 NOTE — PROCEDURES
"Hima Leija is a 89 y.o. male patient.    Temp: 97.8 °F (36.6 °C) (25)  Pulse: 62 (25)  Resp: 18 (25)  BP: (!) 171/86 (25)  SpO2: (!) 94 % (25)  Weight: 82.2 kg (181 lb 3.5 oz) (25)  Height: 5' 8" (172.7 cm) (25)       Thoracentesis    Date/Time: 2025 1:55 PM  Location procedure was performed: [facility] PULMONARY [facility]IC[facility]    Performed by: Talat Koo MD  Authorized by: Talat Koo MD  Pre-operative diagnosis: bilateral pleural effusion  Post-operative diagnosis: left pleural effusion  Consent Done: Yes  Consent: Verbal consent obtained. Written consent obtained.  Risks and benefits: risks, benefits and alternatives were discussed  Consent given by: patient  Patient understanding: patient states understanding of the procedure being performed  Patient consent: the patient's understanding of the procedure matches consent given  Procedure consent: procedure consent matches procedure scheduled  Relevant documents: relevant documents present and verified  Test results: test results available and properly labeled  Site marked: the operative site was marked  Imaging studies: imaging studies available  Required items: required blood products, implants, devices, and special equipment available  Patient identity confirmed: , name, verbally with patient, provided demographic data and MRN  Time out: Immediately prior to procedure a "time out" was called to verify the correct patient, procedure, equipment, support staff and site/side marked as required.  Procedure purpose: diagnostic and therapeutic  Indications: pleural effusion  Preparation: Patient was prepped and draped in the usual sterile fashion.  Local anesthesia used: yes  Anesthesia: local infiltration    Anesthesia:  Local anesthesia used: yes  Local Anesthetic: lidocaine 1% without epinephrine  Anesthetic total: 10 mL    Patient sedated: no  Preparation: skin " prepped with ChloraPrep  Patient position: sitting  Ultrasound guidance: yes  Location: left posterior  Intercostal space: 6th  Puncture method: over-the-needle catheter  Needle size: 18  Catheter size: 8 Georgian  Number of attempts: 2  Drainage amount: 1000 ml  Drainage characteristics: serosanguinous  Patient tolerance: Patient tolerated the procedure well with no immediate complications  Chest x-ray performed: yes  Chest x-ray interpreted by me.  Chest x-ray findings: normal findings  Complications: No  Estimated blood loss (mL): 0  Specimens: Yes  Implants: No  Comments: Tube #1: Gram stain, culture, KOH, AFB, Tube #2: Chemistry: Alb, Carey, Chol, LDH, Prot, Tube #3: Cytology, Lavender for wbcc, Urine specimen cup for cytology     Drainage Tube: removed        1/25/2025

## 2025-01-25 NOTE — HPI
Hima Leija is a 89 y.o. male with HTN, dm 2, Afib, recent hospital admission for right sided pneumonia discharged on home O2 presenting with chief complaint of shortness of breath.  Patient presents to the ER accompanied by his daughter.  He reports that he was discharged after 7 day admission for pneumonia and was told to use oxygen p.r.n..  Recently he was weaned up to 4 L nasal cannula continuously about 1 week ago.  Patient has had continued and worsening shortness of breath during this time.  He has also been having worsening bilateral lower extremity edema.  Patient has had lower extremity edema before but never to this extent.  Denies chest pain.     Cardiology cnsult for CHF exacerbation.  PMHx: CAD s/p CABG, HTN, HLD, PAD, DM type 2, afib Dx in 12/23 when admitted for PNA.  C/o SOB orthopnea and leg swelling    Troponin 0.013  Cr 0.8 HGB 10.1 and , UA -  EKG AFIB VR  LE venous US showed no DVT  Echo in 12/23 EF 45%  Chest ct showed bilateral large pleural effusion L > R and patchy infiltrate

## 2025-01-25 NOTE — CARE UPDATE
Evaluated patient at bedside  Family present    Here due to lower extremity edema   Received lasix at prior hospital and experienced urinary retention  Recently started on 4L continuously for worsening dyspnea associated with weight gain, lower extremity edema and abdominal distension  Takes 2 fluid pills   Prior only on 2-3L prn  Endorses dysphagia    No acute distress  No respiratory distress on 4.5L nasal cannula  Decreased   Ill appearing  AO3  Lower extremity edema bilateral   Anterior chest scar, healed  Anterior left knee scar  Bruising   Fragile skin    Pulmonology consulted  Awaiting thoracentesis for pleural effusion    Pmh afib  Holding eliquis this hospitalization for thora    Elevated bnp  Echocardiogram pending  Continue lasix and k supplemental  Wean supplemental as warranted     Speech consulted for dysphagia  Diet changed to minced moist

## 2025-01-25 NOTE — ASSESSMENT & PLAN NOTE
"Patient's FSGs are controlled on current medication regimen.  Last A1c reviewed-   Lab Results   Component Value Date    HGBA1C 6.5 (H) 12/29/2023     Most recent fingerstick glucose reviewed- No results for input(s): "POCTGLUCOSE" in the last 24 hours.  Current correctional scale  Low  Titrate as needed  anti-hyperglycemic dose as follows-   Antihyperglycemics (From admission, onward)      None        Plan:  -SSI  -A1c  -Accu-checks  -Hold oral hypoglycemics while patient is in the hospital  -Hypoglycemic protocol      "

## 2025-01-25 NOTE — ASSESSMENT & PLAN NOTE
Patient with known CAD s/p CABG, which is controlled Will continue  continue home medications, currently holding antiplatelet/anticoagulation therapies pending potential thoracentesis.  and monitor for S/Sx of angina/ACS. Continue to monitor on telemetry.

## 2025-01-26 PROBLEM — I27.29 PULMONARY HYPERTENSION ASSOC WITH UNCLEAR MULTI-FACTORIAL MECHANISMS: Status: ACTIVE | Noted: 2025-01-26

## 2025-01-26 LAB
ALBUMIN FLD-MCNC: 1.6 G/DL
ANION GAP SERPL CALC-SCNC: 12 MMOL/L (ref 8–16)
APPEARANCE FLD: NORMAL
BODY FLD TYPE: NORMAL
BUN SERPL-MCNC: 30 MG/DL (ref 10–30)
CALCIUM SERPL-MCNC: 8.9 MG/DL (ref 8.7–10.5)
CHLORIDE SERPL-SCNC: 96 MMOL/L (ref 95–110)
CO2 SERPL-SCNC: 33 MMOL/L (ref 23–29)
COLOR FLD: YELLOW
CREAT SERPL-MCNC: 0.9 MG/DL (ref 0.5–1.4)
EST. GFR  (NO RACE VARIABLE): >60 ML/MIN/1.73 M^2
GLUCOSE FLD-MCNC: 129 MG/DL
GLUCOSE SERPL-MCNC: 103 MG/DL (ref 70–110)
GRAM STN SPEC: NORMAL
GRAM STN SPEC: NORMAL
LYMPHOCYTES NFR FLD MANUAL: 65 %
MESOTHL CELL NFR FLD MANUAL: 4 %
MONOS+MACROS NFR FLD MANUAL: 24 %
NEUTROPHILS NFR FLD MANUAL: 7 %
OHS QRS DURATION: 80 MS
OHS QTC CALCULATION: 431 MS
POTASSIUM SERPL-SCNC: 3.5 MMOL/L (ref 3.5–5.1)
PROT FLD-MCNC: 2.8 G/DL
SODIUM SERPL-SCNC: 141 MMOL/L (ref 136–145)
SPECIMEN SOURCE: NORMAL
WBC # FLD: 1293 /CU MM

## 2025-01-26 PROCEDURE — 25000003 PHARM REV CODE 250: Performed by: INTERNAL MEDICINE

## 2025-01-26 PROCEDURE — 82945 GLUCOSE OTHER FLUID: CPT | Performed by: INTERNAL MEDICINE

## 2025-01-26 PROCEDURE — 63600175 PHARM REV CODE 636 W HCPCS: Performed by: FAMILY MEDICINE

## 2025-01-26 PROCEDURE — 89051 BODY FLUID CELL COUNT: CPT | Performed by: INTERNAL MEDICINE

## 2025-01-26 PROCEDURE — 84157 ASSAY OF PROTEIN OTHER: CPT | Performed by: INTERNAL MEDICINE

## 2025-01-26 PROCEDURE — 88305 TISSUE EXAM BY PATHOLOGIST: CPT | Performed by: STUDENT IN AN ORGANIZED HEALTH CARE EDUCATION/TRAINING PROGRAM

## 2025-01-26 PROCEDURE — 92523 SPEECH SOUND LANG COMPREHEN: CPT

## 2025-01-26 PROCEDURE — 32555 ASPIRATE PLEURA W/ IMAGING: CPT | Mod: RT,,, | Performed by: INTERNAL MEDICINE

## 2025-01-26 PROCEDURE — 63600175 PHARM REV CODE 636 W HCPCS: Performed by: INTERNAL MEDICINE

## 2025-01-26 PROCEDURE — 99233 SBSQ HOSP IP/OBS HIGH 50: CPT | Mod: 25,,, | Performed by: INTERNAL MEDICINE

## 2025-01-26 PROCEDURE — 27000221 HC OXYGEN, UP TO 24 HOURS

## 2025-01-26 PROCEDURE — 25000003 PHARM REV CODE 250: Performed by: FAMILY MEDICINE

## 2025-01-26 PROCEDURE — 63700000 PHARM REV CODE 250 ALT 637 W/O HCPCS: Performed by: INTERNAL MEDICINE

## 2025-01-26 PROCEDURE — 25000003 PHARM REV CODE 250: Performed by: HOSPITALIST

## 2025-01-26 PROCEDURE — 36415 COLL VENOUS BLD VENIPUNCTURE: CPT | Performed by: HOSPITALIST

## 2025-01-26 PROCEDURE — 88112 CYTOPATH CELL ENHANCE TECH: CPT | Performed by: STUDENT IN AN ORGANIZED HEALTH CARE EDUCATION/TRAINING PROGRAM

## 2025-01-26 PROCEDURE — 88112 CYTOPATH CELL ENHANCE TECH: CPT | Mod: 26,,, | Performed by: STUDENT IN AN ORGANIZED HEALTH CARE EDUCATION/TRAINING PROGRAM

## 2025-01-26 PROCEDURE — 94761 N-INVAS EAR/PLS OXIMETRY MLT: CPT

## 2025-01-26 PROCEDURE — 82042 OTHER SOURCE ALBUMIN QUAN EA: CPT | Performed by: INTERNAL MEDICINE

## 2025-01-26 PROCEDURE — 0W993ZZ DRAINAGE OF RIGHT PLEURAL CAVITY, PERCUTANEOUS APPROACH: ICD-10-PCS | Performed by: INTERNAL MEDICINE

## 2025-01-26 PROCEDURE — 21400001 HC TELEMETRY ROOM

## 2025-01-26 PROCEDURE — 80048 BASIC METABOLIC PNL TOTAL CA: CPT | Performed by: HOSPITALIST

## 2025-01-26 PROCEDURE — 99232 SBSQ HOSP IP/OBS MODERATE 35: CPT | Mod: ,,, | Performed by: INTERNAL MEDICINE

## 2025-01-26 PROCEDURE — 92610 EVALUATE SWALLOWING FUNCTION: CPT

## 2025-01-26 PROCEDURE — 88305 TISSUE EXAM BY PATHOLOGIST: CPT | Mod: 26,,, | Performed by: STUDENT IN AN ORGANIZED HEALTH CARE EDUCATION/TRAINING PROGRAM

## 2025-01-26 RX ORDER — SODIUM CHLORIDE 0.9 % (FLUSH) 0.9 %
10 SYRINGE (ML) INJECTION
Status: DISCONTINUED | OUTPATIENT
Start: 2025-01-26 | End: 2025-01-27 | Stop reason: HOSPADM

## 2025-01-26 RX ORDER — POTASSIUM CHLORIDE 20 MEQ/1
40 TABLET, EXTENDED RELEASE ORAL 2 TIMES DAILY
Status: DISCONTINUED | OUTPATIENT
Start: 2025-01-26 | End: 2025-01-27 | Stop reason: HOSPADM

## 2025-01-26 RX ADMIN — AZITHROMYCIN DIHYDRATE 250 MG: 250 TABLET ORAL at 09:01

## 2025-01-26 RX ADMIN — MAGNESIUM OXIDE TAB 400 MG (241.3 MG ELEMENTAL MG) 400 MG: 400 (241.3 MG) TAB at 08:01

## 2025-01-26 RX ADMIN — ATORVASTATIN CALCIUM 40 MG: 40 TABLET, FILM COATED ORAL at 09:01

## 2025-01-26 RX ADMIN — AMLODIPINE BESYLATE 10 MG: 10 TABLET ORAL at 08:01

## 2025-01-26 RX ADMIN — MAGNESIUM OXIDE TAB 400 MG (241.3 MG ELEMENTAL MG) 400 MG: 400 (241.3 MG) TAB at 09:01

## 2025-01-26 RX ADMIN — APIXABAN 2.5 MG: 2.5 TABLET, FILM COATED ORAL at 02:01

## 2025-01-26 RX ADMIN — CEFTRIAXONE 1 G: 1 INJECTION, POWDER, FOR SOLUTION INTRAMUSCULAR; INTRAVENOUS at 10:01

## 2025-01-26 RX ADMIN — LOSARTAN POTASSIUM 100 MG: 50 TABLET, FILM COATED ORAL at 09:01

## 2025-01-26 RX ADMIN — FUROSEMIDE 40 MG: 10 INJECTION, SOLUTION INTRAMUSCULAR; INTRAVENOUS at 10:01

## 2025-01-26 RX ADMIN — METOPROLOL TARTRATE 100 MG: 50 TABLET, FILM COATED ORAL at 08:01

## 2025-01-26 RX ADMIN — METOPROLOL TARTRATE 100 MG: 50 TABLET, FILM COATED ORAL at 09:01

## 2025-01-26 RX ADMIN — POTASSIUM CHLORIDE 40 MEQ: 1500 TABLET, EXTENDED RELEASE ORAL at 09:01

## 2025-01-26 RX ADMIN — POTASSIUM CHLORIDE 40 MEQ: 1500 TABLET, EXTENDED RELEASE ORAL at 08:01

## 2025-01-26 RX ADMIN — FUROSEMIDE 40 MG: 10 INJECTION, SOLUTION INTRAMUSCULAR; INTRAVENOUS at 08:01

## 2025-01-26 RX ADMIN — APIXABAN 2.5 MG: 2.5 TABLET, FILM COATED ORAL at 11:01

## 2025-01-26 NOTE — SUBJECTIVE & OBJECTIVE
"  ROS  Objective:     Vital Signs (Most Recent):  Temp: 97.8 °F (36.6 °C) (01/26/25 1607)  Pulse: (!) 57 (01/26/25 1726)  Resp: 18 (01/26/25 1607)  BP: 122/63 (01/26/25 1607)  SpO2: 96 % (01/26/25 1726) Vital Signs (24h Range):  Temp:  [97.8 °F (36.6 °C)-98.4 °F (36.9 °C)] 97.8 °F (36.6 °C)  Pulse:  [54-77] 57  Resp:  [16-18] 18  SpO2:  [94 %-99 %] 96 %  BP: (108-142)/(61-78) 122/63     Weight: 81.3 kg (179 lb 3.7 oz)  Body mass index is 27.25 kg/m².     SpO2: 96 %         Intake/Output Summary (Last 24 hours) at 1/26/2025 1749  Last data filed at 1/26/2025 1726  Gross per 24 hour   Intake 990 ml   Output 2100 ml   Net -1110 ml       Lines/Drains/Airways       Peripheral Intravenous Line  Duration                  Peripheral IV - Single Lumen 01/26/25 1641 22 G Anterior;Left Forearm <1 day                       Physical Exam  HENT:      Head: Normocephalic.   Eyes:      Pupils: Pupils are equal, round, and reactive to light.   Neck:      Thyroid: No thyromegaly.      Vascular: Normal carotid pulses. No carotid bruit or JVD.   Cardiovascular:      Rate and Rhythm: Normal rate. Rhythm irregularly irregular. No extrasystoles are present.     Chest Wall: PMI is not displaced.      Pulses: Normal pulses.      Heart sounds: Normal heart sounds. No murmur heard.     No gallop. No S3 sounds.   Pulmonary:      Effort: No respiratory distress.      Breath sounds: No stridor. Decreased breath sounds present.   Abdominal:      General: Bowel sounds are normal.      Palpations: Abdomen is soft.      Tenderness: There is no abdominal tenderness. There is no rebound.   Musculoskeletal:         General: Swelling present.   Skin:     General: Skin is warm and dry.      Findings: No rash.   Neurological:      Mental Status: He is alert and oriented to person, place, and time.   Psychiatric:         Behavior: Behavior normal.            Significant Labs: ABG: No results for input(s): "PH", "PCO2", "HCO3", "POCSATURATED", "BE" in the " "last 48 hours., Blood Culture: No results for input(s): "LABBLOO" in the last 48 hours., BMP:   Recent Labs   Lab 01/25/25  0509 01/26/25  0500   * 103    141   K 3.3* 3.5    96   CO2 31* 33*   BUN 27 30   CREATININE 0.8 0.9   CALCIUM 8.9 8.9   MG 1.6  --    , CMP   Recent Labs   Lab 01/25/25  0509 01/25/25  1324 01/26/25  0500     --  141   K 3.3*  --  3.5     --  96   CO2 31*  --  33*   *  --  103   BUN 27  --  30   CREATININE 0.8  --  0.9   CALCIUM 8.9  --  8.9   PROT 7.0 7.6  --    ALBUMIN 3.0*  --   --    BILITOT 1.0  --   --    ALKPHOS 95  --   --    AST 20  --   --    ALT 16  --   --    ANIONGAP 12  --  12   , CBC   Recent Labs   Lab 01/25/25  0509   WBC 3.29*   HGB 10.1*   HCT 32.5*   *   , INR No results for input(s): "INR", "PROTIME" in the last 48 hours., Lipid Panel No results for input(s): "CHOL", "HDL", "LDLCALC", "TRIG", "CHOLHDL" in the last 48 hours., and Troponin No results for input(s): "TROPONINIHS" in the last 48 hours.    Significant Imaging: EKG:    "

## 2025-01-26 NOTE — ASSESSMENT & PLAN NOTE
Patient found to have large pleural effusion on imaging. I have personally reviewed and interpreted the following imaging: CT. A thoracentesis was deferred. Most likely etiology currently unknown however differentials includes  pneumonia vs CHF . Management to include Diuresis.  Pulmonology consulted and awaiting further evaluation/recommendations regarding therapeutic and diagnostic thoracentesis.  Patient currently saturating 100% on 4 L via nasal cannula in no acute distress without use of accessory muscles noted.  Plan:  -Continue home medications, titrate as needed  -Titrate oxygen requirements as needed  -Incentive spirometry   -Monitor pulse oximetry  -Marcio prn  -Continue p.r.n. Lasix  -f/u pulmonology     Status post left thoracentesis  Repeat right thoracentesis today per pulmonology

## 2025-01-26 NOTE — ASSESSMENT & PLAN NOTE
AFIB with RVR    - increase Lopressor to 100 mg bid  - advise to START HEPARIN GTT IF NO HIGH BLEEDING RISK    01/26/2025  AFIB VR C  Continue eliquis and BB

## 2025-01-26 NOTE — PT/OT/SLP EVAL
"Speech Language Pathology Evaluation  Cognitive/Bedside Swallow    Patient Name:  Hima Leija   MRN:  [id number]  Admitting Diagnosis: Shortness of breath    Recommendations:                  General Recommendations:  Follow-up not indicated  Diet recommendations:  Minced & Moist Diet - IDDSI Level 5, Thin liquids - IDDSI Level 0   Aspiration Precautions: 1 bite/sip at a time, Avoid talking while eating, Feed only when awake/alert, Frequent oral care, HOB to 90 degrees, Meds whole buried in puree, Small bites/sips, and Standard aspiration precautions   General Precautions: Standard, aspiration  Communication strategies:  none    Assessment:     Hima Leija is a 89 y.o. male w a PMHx significant for CHF, PAD, and paroxysmal atrial fibrillation. Daughter reports chronic dysphagia > solids for more than a year. Pt is s/p R thoracentesis today. Pt endorses improved SOB since procedure. Pt also reports "choking" episode with coffee this morning prior to procedure. He presents with functional OM and cognitive-linguistic ability to meet acute needs. No overt s/s of aspiration appreciated throughout CSE. Episode of "choking" this AM likely related to deficits in respiratory-swallow coordination prior to thoracentesis. Pt also appreciated w impulsive/large bites/sips also increasing risk for dysphagia. ST educated pt and family on standard aspiration precautions and recommendation to take small/single bites/sips and pills in applesauce when needed. ST also discussed w pt and family should symptoms persist/worsen post-acute to reach out for outpatient MBSS for further assessment. Pt is recommended to continue diet of minced and moist solids (IDDSI 5) and thin liquids (IDDSI 0) at this time. No further acute ST indicated at this time. MD to re-consult if medically indicated.     History:     Past Medical History:   Diagnosis Date    Bleeding from the nose     R/T ASPIRIN USE    CHF (congestive heart failure)     " Coronary artery disease     Diabetes mellitus     [place] (hard of hearing)     Hyperlipidemia     Hypertension     Osteoarthritis     PAD (peripheral artery disease)     Paroxysmal atrial fibrillation     Wears dentures     UPPER        Past Surgical History:   Procedure Laterality Date    BACK SURGERY      CARDIAC SURGERY      CATARACT EXTRACTION, BILATERAL      COLONOSCOPY      CORONARY ARTERY BYPASS GRAFT      JOINT REPLACEMENT Left 06/06/2019    knee    TOTAL KNEE ARTHROPLASTY Left 06/06/2019    Procedure: ARTHROPLASTY, KNEE, TOTAL, left;  Surgeon: ISABEL Gaitan II, MD;  Location: [facility];  Service: Orthopedics;  Laterality: Left;       Social History: Patient lives with his daughter in [place], [place].    Prior Intubation HX:  NA    Modified Barium Swallow: NA    CTA CHEST NON CORONARY (PE STUDIES) on 01/24/2025:    FINDINGS:  Examination degraded by motion.  Good contrast bolus opacification of the pulmonary arteries. No acute pulmonary thromboembolism. No hilar or mediastinal mass or lymphadenopathy. Large bilateral pleural effusions, left larger than right.  Compressive atelectasis involving bilateral lower lobes as well as the lingula.  Patchy airspace opacities in the right upper lobe.  Limited images through the upper abdomen are unremarkable.     Impression:  Examination degraded by motion.     No acute pulmonary thromboemboli.     Large bilateral pleural effusions, left larger than right.     Compressive atelectasis involving bilateral lower lobes as well as the lingula. Patchy airspace opacities in the right upper lobe.     Electronically signed by:Evens Gutierrez MD  Date:                                            01/24/2025  Time:                                           02:05    XR CHEST 1 VIEW on 01/26/2025:     FINDINGS:  Decreased left-sided opacity with continued atelectasis likely significant reduction in pleural effusion     The cardiac silhouette is normal in size. The hilar and mediastinal  contours are unremarkable.     Bones are intact.     Impression:  As above.     Electronically signed by:John Colon  Date:                                            01/26/2025  Time:                                           11:09    Prior diet: regular.    Occupation/hobbies/homemaking: Pt lives w his daughter and her , family assists w ADLs, medication management, meals, and transportation..    Subjective     Pt seen bedside with family present for ST eval  Patient goals: to go home     Pain/Comfort:  Pain Rating 1: 0/10  Pain Rating Post-Intervention 1: 0/10  Pain Rating 2: 0/10  Pain Rating Post-Intervention 2: 0/10    Respiratory Status:  see chart    Objective:     Cognitive Status:    Pt oriented with functional memory recall and reasoning.     Receptive Language:   Comprehension:   WFL during conversation    Pragmatics:    WFL    Expressive Language:  Verbal:    WFL during conversation    Motor Speech:  WFL    Voice:   WFL      Oral Musculature Evaluation  Oral Musculature: WFL  Dentition: lower dentures, present and adequate (absent lower dentures, reports does not use to eat)  Secretion Management: adequate  Mucosal Quality: good  Mandibular Strength and Mobility: WFL  Oral Labial Strength and Mobility: WFL  Lingual Strength and Mobility: WFL  Velar Elevation: WFL  Buccal Strength and Mobility: WFL  Volitional Cough: present  Volitional Swallow: appreciated  Voice Prior to PO Intake: clear    Bedside Swallow Eval:   Clinical Swallow Examination:   Of note, patient self-fed throughout evaluation. Patient presented with:     CONSISTENCY  NOTES   THIN (IDDSI 0)  Cup/straw sips, sequential sips   No overt s/s of aspiration appreciated. Appreciated large sequential sips-impulsivity     PUREE (IDDSI 4/Extremely Thick)   TSP/TBSP bites of pudding No overt s/s of aspiration appreciated    SOLID (IDDSI 7/Regular) Bite of Tamela Doone cookie   No overt s/s of aspiration appreciated      Thickened liquids  were not used in this assessment. Shelilfe (2018) reported that thickened liquids have no sound evidence at reducing the risk of pneumonia in patients with dysphagia and can cause harm by increasing their risk of dehydration. It also presents an increased risk of UTI, electrolyte imbalance, constipation, fecal impaction, cognitive impairment, functional decline and even death (iLliam, 2002; Geoff, 2016).  Thickened liquids are associated with risks including dehydration, increased pharyngeal residue, potential interference with medication absorption, and decreased quality of life (Kameron, 2013). Thickened liquids are also more likely to be silently aspirated than thin liquids (Jose et al., 2018). This supports the assertion that we should confirm a patient requires thickened liquids with an instrumental swallow study prior to recommending them.    References:   Kameron CESPEDES (2013). Thickening agents used for dysphagia management: Effect on bioavailability of water, medication and feelings of satiety. Nutrition Journal, 12, 54. https://doi.org/10.1186/9177-2680-75-54    RUBINA Garcia, PAM Carpenter, MELVINA Iqbal, & RUBINA Wade (2018). Cough response to aspiration in thin and thick fluids during FEES in hospitalized inpatients. International journal of language & communication disorders, 53(5), 909-918. https://doi.org/10.1111/1580-8565.61213    INTERPRETATION AND RISK ASSESSMENT:  Clinical swallow evaluation (CSE) revealed oral phase characterized by lingual, labial, and buccal strength and range of motion functional for lip closure, bolus preparation and propulsion. The patient had no anterior loss of the bolus with complete closure of the lips around the utensils. No residue remained in the oral cavity following the swallow. Patient without overt clinical signs/symptoms of aspiration on any PO trials given.  Contributing risk factors for dysphagia include deficits in respiratory-swallow coordination.    Compensatory  Strategies  Standard aspiration precautions  Small/single bites/sips    Goals:   Multidisciplinary Problems       SLP Goals       Not on file                    Plan:     Patient to be seen:      Plan of Care expires:     Plan of Care reviewed with:  patient, daughter   SLP Follow-Up:  No       Discharge recommendations:  Therapy Intensity Recommendations at Discharge: No Therapy Indicated   Barriers to Discharge:  None    Time Tracking:     SLP Treatment Date:   01/26/25  Speech Start Time:  1134  Speech Stop Time:  1147     Speech Total Time (min):  13 min    Billable Minutes: Eval 6  and Eval Swallow and Oral Function 7    Marie Mcghee MA, PL-SLP, CCC-SLP  Speech Language Pathologist  01/26/2025

## 2025-01-26 NOTE — HOSPITAL COURSE
The patient is a 89 yo male with CHF, CAD s/p CABG, HTN, HLD, PAD, DM type 2, Afib,a dn chronic respiratory failure admitted 1/25/25 with Large bilateral pleural effusions Left>Right and CHF exacerbation on IV Lasix. Echo showed EF 40% PASP 64 mmHg. Pulmonology and Cardiology consulted. Pt underwent left thoracentesis 1/25/25 draining 1 liter pleural fluid. Tolerated well. Fluid studies suggested transudate. Cardiology recommended increase Lasix to 40 mg bid IV and d/c HCTZ and increase lopressor to 100 mg bid, continue amlodipine and losartan. On 1/26/25, the pt underwent right thoracentesis draining 950ml pleural fluid. The patient tolerated procedures well. SOB improved. IV Lasix transitioned to Lasix 40mg oral daily. Pt will f/u with Pulmonology and Cardiology.  The patient was educated on 2gm sodium diet with 1500ml fluid restriction. Ambulatory pulse ox done and pt continued to require home oxygen (which he already had). The patient was seen and examined today and determined to be stable for discharge.

## 2025-01-26 NOTE — ASSESSMENT & PLAN NOTE
Summary  .    Pulmonary Artery: There is moderate to severe pulmonary hypertension. The estimated pulmonary artery systolic pressure is 64 mmHg.    IVC/SVC: Elevated venous pressure at 15 mmHg.    Pericardium: Bilateral pleural effusions.    Continue diuresis   Fluid management

## 2025-01-26 NOTE — HOSPITAL COURSE
Cardiology cnsult for CHF exacerbation.  PMHx: CAD s/p CABG, HTN, HLD, PAD, DM type 2, afib Dx in 12/23 when admitted for PNA.  C/o SOB orthopnea and leg swelling    Troponin 0.013  Cr 0.8 HGB 10.1 and , UA -  EKG AFIB VR  LE venous US showed no DVT  Echo in 12/23 EF 45%  Chest ct showed bilateral large pleural effusion L > R and patchy infiltrate      01/26/2025  S/p b/l thoracentesis removal of 1.9 liters. SOB improved, echo showed EF 40% PASP 64 mmHg

## 2025-01-26 NOTE — CONSULTS
"RD consulted for, "For education on fluid and salt restriction."  Currently providing remote weekend coverage.    Pt receiving Cardiac-Minced and Moist diet with 1500 mL fluid restriction. 100% intake recorded.     Attached diet education handouts to discharge instructions.  On-site RD to follow up with in-person education as appropriate.    Please Re-Consult as needed.    Thank you.    "

## 2025-01-26 NOTE — HOSPITAL COURSE
01/26/2025:  Postprocedure x-ray reviewed , plan for right-sided thoracentesis., echo reviewed, afebrile, oxygen 4 liters/minute

## 2025-01-26 NOTE — ASSESSMENT & PLAN NOTE
Patient has persistent (7 days or more) atrial fibrillation. Patient is currently in atrial fibrillation. [id number] Score: 4. The patients heart rate in the last 24 hours is as follows:  Pulse  Min: 56  Max: 113     Antiarrhythmics  metoprolol tartrate (LOPRESSOR) tablet 100 mg, 2 times daily, Oral    Anticoagulants       Plan  - Replete lytes with a goal of K>4, Mg >2  - Patient is not anticoagulated due to prior history of nosebleeds  - Patient's afib is currently controlled  -  challenge to restart Eliquis after thoracentesis.

## 2025-01-26 NOTE — PROGRESS NOTES
[facility] - Tele[facility] ([facility])  Pulmonology  Progress Note    Patient Name: Hima Leija  MRN: [id number]  Admission Date: 1/25/2025  Hospital Length of Stay: 1 days  Code Status: Full Code  Attending Provider: Fredis Yanez MD  Primary Care Provider: Darci Rooney PA   Principal Problem: Shortness of breath    Subjective:     Hima Leija is 89 y.o.  Referred to [facility] for Pulmonary  Bilateral pleural effusinLeft>> right  Request for thoracentsis  H&P reveiwed  Granddaughter at bedised  Has Home O2 and see Local pul provider  Hx Heavy smoking, quit 1996  Worked on [employer]  Denied asbestosis exposure  Anticoagulation in past stopped due to nose bleeds  PMH  has a past medical history of Bleeding from the nose, CHF (congestive heart failure), Coronary artery disease, Diabetes mellitus, [place] (hard of hearing), Hyperlipidemia, Hypertension, Osteoarthritis, PAD (peripheral artery disease), Paroxysmal atrial fibrillation,   Recent worsening dyspnea/shortness a breath both at rest and on exertion.  Patient recently discharged from outside facility following admission for treatment of pneumonia and was found to have new onset atrial fibrillation which was rate controlled at time of discharge on home metoprolol.  Since discharge, patient has had progressively worsening dyspnea/shortness of breath prompting patient to return to ED for further evaluation was found to have evidence of large bilateral pleural effusion with left greater than right upon initial workup.          Patient currently saturating 100% on 4 L via nasal cannula.          Interval History:   01/26/2025:  Postprocedure x-ray reviewed , plan for right-sided thoracentesis., echo reviewed, afebrile, oxygen 4 liters/minute      Objective:     Vital Signs (Most Recent):  Temp: 98.4 °F (36.9 °C) (01/26/25 0734)  Pulse: 77 (01/26/25 0734)  Resp: 18 (01/26/25 0734)  BP: 139/73 (01/26/25 0734)  SpO2: 96 % (01/26/25 0734) Vital Signs (24h  Range):  Temp:  [97.8 °F (36.6 °C)-98.4 °F (36.9 °C)] 98.4 °F (36.9 °C)  Pulse:  [] 77  Resp:  [16-18] 18  SpO2:  [94 %-99 %] 96 %  BP: (124-171)/(61-86) 139/73     Weight: 82.2 kg (181 lb 3.5 oz)  Body mass index is 27.55 kg/m².      Intake/Output Summary (Last 24 hours) at 1/26/2025 0854  Last data filed at 1/26/2025 0610  Gross per 24 hour   Intake 720 ml   Output 2850 ml   Net -2130 ml        Physical Exam  Vitals and nursing note reviewed.   Constitutional:       Appearance: He is well-developed.   HENT:      Head: Normocephalic and atraumatic.      Nose: Nose normal.   Eyes:      Pupils: Pupils are equal, round, and reactive to light.   Cardiovascular:      Rate and Rhythm: Normal rate and regular rhythm.      Heart sounds: Normal heart sounds.   Pulmonary:      Breath sounds: Decreased air movement present. Examination of the right-lower field reveals decreased breath sounds. Examination of the left-lower field reveals decreased breath sounds. Decreased breath sounds present.   Abdominal:      Palpations: Abdomen is soft.   Musculoskeletal:         General: Swelling present. Normal range of motion.      Cervical back: Normal range of motion and neck supple.   Skin:     General: Skin is warm and dry.      Capillary Refill: Capillary refill takes 2 to 3 seconds.   Neurological:      Mental Status: He is alert and oriented to person, place, and time.      Cranial Nerves: No cranial nerve deficit.           Review of Systems   Respiratory:  Positive for shortness of breath.    Neurological:  Positive for weakness.       Vents:       Lines/Drains/Airways       Peripheral Intravenous Line  Duration                  Peripheral IV - Single Lumen 01/24/25 0907 18 G Anterior;Left;Proximal Forearm 1 day                    Significant Labs:    CBC/Anemia Profile:  Recent Labs   Lab 01/25/25  0509   WBC 3.29*   HGB 10.1*   HCT 32.5*   *   *   RDW 16.0*        Chemistries:  Recent Labs   Lab  01/25/25  0509 01/25/25  1324 01/26/25  0500     --  141   K 3.3*  --  3.5     --  96   CO2 31*  --  33*   BUN 27  --  30   CREATININE 0.8  --  0.9   CALCIUM 8.9  --  8.9   ALBUMIN 3.0*  --   --    PROT 7.0 7.6  --    BILITOT 1.0  --   --    ALKPHOS 95  --   --    ALT 16  --   --    AST 20  --   --    MG 1.6  --   --      Component      Latest Ref Rng 1/25/2025   Body Fluid Type Thoracentesis Fluid    Fluid Appearance Clear    Fluid Color Yellow    WBC, Body Fluid      /cu mm 1107    Lymphs, Fluid      % 92    Monocytes/Macrophages, Fluid      % 8    Body Fluid Source, Total Protein Thoracentesis Fluid    Body Fluid, Protein      Not established g/dL 3.5    Body Fluid Source, Albumin Thoracentesis Fluid    Body Fluid, Albumin      See text g/dL 1.8    Body Fluid Source, LDH Thoracentesis Fluid    LD, Fluid      Not established U/L 106    Body Fluid Source, Glucose Thoracentesis Fluid    Glucose Body Fluid      Not established mg/dL 139    Body Fluid Source Amylase Thoracentesis Fluid    Amylase, Fluid      Not established U/L 37    BNP      0 - 99 pg/mL 236 (H)    Lactate Dehydrogenase      110 - 260 U/L 209    PROTEIN TOTAL      6.0 - 8.4 g/dL 7.6       Legend:  (H) High    All pertinent labs within the past 24 hours have been reviewed.    Significant Imaging:  I have reviewed all pertinent imaging results/findings within the past 24 hours.    X-Ray Chest 1 View  Narrative: EXAMINATION:  XR CHEST 1 VIEW    CLINICAL HISTORY:  followup;    TECHNIQUE:  Single frontal view of the chest was performed.    COMPARISON:  None    FINDINGS:  Some improvement in right-sided opacities with enlargement of left-sided consolidation.  Bilateral pleural effusions suspected.    The cardiac silhouette is enlarged.  The hilar and mediastinal contours are unremarkable.    Bones are intact.  Impression: As above.    Electronically signed by: John Colon  Date:    01/26/2025  Time:    06:06       Summary  Show Result  "Comparison     Left Ventricle: The left ventricle is normal in size. Ventricular mass is normal. Normal wall thickness. There is concentric remodeling. Mild global hypokinesis present. There is mildly reduced systolic function with a visually estimated ejection fraction of 40 - 45%. Grade III diastolic dysfunction.    Right Ventricle: Normal right ventricular cavity size. Wall thickness is normal. Systolic function is reduced.    Left Atrium: Left atrium is moderately dilated.    Right Atrium: Right atrium is dilated.    Aortic Valve: There is mild aortic valve sclerosis.    Tricuspid Valve: There is moderate to severe regurgitation.    Pulmonic Valve: There is mild regurgitation.    Pulmonary Artery: There is moderate to severe pulmonary hypertension. The estimated pulmonary artery systolic pressure is 64 mmHg.    IVC/SVC: Elevated venous pressure at 15 mmHg.    Pericardium: Bilateral pleural effusions.    ABG  No results for input(s): "PH", "PO2", "PCO2", "HCO3", "BE" in the last 168 hours.  Assessment/Plan:     Pulmonary  Chronic respiratory failure with hypoxia  Patient with Hypoxic Respiratory failure which is Acute on chronic.  he is on home oxygen at 3 LPM. Supplemental oxygen was provided and noted-      .   Signs/symptoms of respiratory failure include- tachypnea and increased work of breathing. Contributing diagnoses includes - CHF Labs and images were reviewed. Patient Has not had a recent ABG. Will treat underlying causes and adjust management of respiratory failure as follows-      Status post left thoracentesis in 1000 meals  Right thoracentesis plans today    Bilateral pleural effusion  Patient found to have moderate pleural effusion on imaging. I have personally reviewed and interpreted the following imaging: CT. A thoracentesis was ordered. Most likely etiology includes Congestive Heart Failure. Management to include Diuresis and thoracenteis    Status post left thoracentesis 1000 mls:  Transudative " characteristics  Planned right thoracentesis    Cardiac/Vascular  Pulmonary hypertension assoc with unclear multi-factorial mechanisms    Summary  .    Pulmonary Artery: There is moderate to severe pulmonary hypertension. The estimated pulmonary artery systolic pressure is 64 mmHg.    IVC/SVC: Elevated venous pressure at 15 mmHg.    Pericardium: Bilateral pleural effusions.    Continue diuresis   Fluid management       Acute on chronic diastolic congestive heart failure  Patient has Combined Systolic and Diastolic heart failure that is Acute on chronic. On presentation their CHF was decompensated. Evidence of decompensated CHF on presentation includes: edema and weight gain. The etiology of their decompensation is likely increased fluid intake. Most recent BNP and echo results are listed below.  Recent Labs     01/24/25  0218 01/25/25  0509   * 236*     Latest ECHO  Results for orders placed during the hospital encounter of 01/25/25    Echo    Interpretation Summary    Left Ventricle: The left ventricle is normal in size. Ventricular mass is normal. Normal wall thickness. There is concentric remodeling. Mild global hypokinesis present. There is mildly reduced systolic function with a visually estimated ejection fraction of 40 - 45%. Grade III diastolic dysfunction.    Right Ventricle: Normal right ventricular cavity size. Wall thickness is normal. Systolic function is reduced.    Left Atrium: Left atrium is moderately dilated.    Right Atrium: Right atrium is dilated.    Aortic Valve: There is mild aortic valve sclerosis.    Tricuspid Valve: There is moderate to severe regurgitation.    Pulmonic Valve: There is mild regurgitation.    Pulmonary Artery: There is moderate to severe pulmonary hypertension. The estimated pulmonary artery systolic pressure is 64 mmHg.    IVC/SVC: Elevated venous pressure at 15 mmHg.    Pericardium: Bilateral pleural effusions.    Current Heart Failure Medications  losartan tablet  100 mg, Daily, Oral  furosemide injection 40 mg, Every 12 hours, Intravenous    Plan  - Monitor strict I&Os and daily weights.    - Place on telemetry  - Low sodium diet  - Place on fluid restriction of 1.5 L.   - Cardiology has been consulted  - The patient's volume status is improving but not at their baseline as indicated by weight gain and dyspnea on exertion (GOMEZ)  - continue diuresis        Atrial fibrillation  Patient has persistent (7 days or more) atrial fibrillation. Patient is currently in atrial fibrillation. [id number] Score: 4. The patients heart rate in the last 24 hours is as follows:  Pulse  Min: 56  Max: 113     Antiarrhythmics  metoprolol tartrate (LOPRESSOR) tablet 100 mg, 2 times daily, Oral    Anticoagulants       Plan  - Replete lytes with a goal of K>4, Mg >2  - Patient is not anticoagulated due to prior history of nosebleeds  - Patient's afib is currently controlled  -  challenge to restart Eliquis after thoracentesis.        Upon discharge can follow up with the establish pulmonologist in his local area.  We will notify patient with cytology results from pleural fluid       Talat Koo MD  Pulmonology  [facility] - Telemetry ([facility])

## 2025-01-26 NOTE — PROGRESS NOTES
[facility] Medicine  Progress Note    Patient Name: Hima Leija  MRN: [id number]  Patient Class: IP- Inpatient   Admission Date: 1/25/2025  Length of Stay: 1 days  Attending Physician: Fredis Yanez MD  Primary Care Provider: Darci Rooney PA        Subjective     Principal Problem:Shortness of breath        HPI:  Hima Leija is a 89 y.o. male with a PMH  has a past medical history of Bleeding from the nose, CHF (congestive heart failure), Coronary artery disease, Diabetes mellitus, [place] (hard of hearing), Hyperlipidemia, Hypertension, Osteoarthritis, PAD (peripheral artery disease), Paroxysmal atrial fibrillation, and Wears dentures. who presented as a transfer from [facility] for higher level of care and further evaluation by pulmonology for bilateral pleural effusion resulting in dyspnea/shortness a breath both at rest and on exertion.  Patient recently discharged from outside facility following admission for treatment of pneumonia and was found to have new onset atrial fibrillation which was rate controlled at time of discharge on home metoprolol.  Since discharge, patient has had progressively worsening dyspnea/shortness of breath prompting patient to return to ED for further evaluation was found to have evidence of large bilateral pleural effusion with left greater than right upon initial workup.  At time of bedside assessment, patient is sitting up in bed in no acute distress complaining of dyspnea and bilateral lower extremity edema but reported all other review of systems negative in his without any additional concerns or complaints.  Pulmonology consulted and awaiting further evaluation/recommendations regarding therapeutic and diagnostic thoracentesis later today.  Patient currently saturating 100% on 4 L via nasal cannula.  Additional history of present illness as noted below per  provider at time of transfer.    Patient is a 89-year-old male with a past  medical history of hypertension, diabetes, atrial fibrillation and recent discharge after treatment for right-sided pneumonia that presents to the emergency department because of shortness of breath.  Patient satting 93% on room air.  Patient hypertensive and tachycardic.  Afebrile.  CT angio of the chest shows large bilateral pleural effusions, left greater than right.  Patchy airspace opacities in the right upper lobe.  No pulmonary emboli.  Patient given DuoNeb, azithromycin, Rocephin and Lasix.  Patient to be transferred to higher level of care for pulmonology consultation and possible thoracentesis.      PCP: Darci Rooney       Overview/Hospital Course:  1/26 status post thoracentesis, left pleural effusion with 1L removed. Tolerated well. Plan for repeat thoracentesis on right. Fluid studies suggest transudate. Remains on 4L nasal cannula. Echocardiogram with severe pulmonary hypertension. Continue diuretic.    Interval History: See hospital course for today      Review of Systems   Constitutional:  Positive for activity change (improving), appetite change (improving) and fatigue. Negative for fever.   HENT:  Positive for trouble swallowing.    Respiratory:  Positive for shortness of breath (improving).    Cardiovascular:  Positive for leg swelling (improving).   Gastrointestinal:  Positive for abdominal distention. Negative for abdominal pain, nausea and vomiting.   Skin:  Positive for wound.   Neurological:  Positive for weakness.   Psychiatric/Behavioral:  Negative for agitation, behavioral problems, confusion, decreased concentration and dysphoric mood. The patient is not nervous/anxious.      Objective:     Vital Signs (Most Recent):  Temp: 98.4 °F (36.9 °C) (01/26/25 0734)  Pulse: 77 (01/26/25 0734)  Resp: 18 (01/26/25 0734)  BP: 139/73 (01/26/25 0734)  SpO2: 96 % (01/26/25 0734) Vital Signs (24h Range):  Temp:  [97.8 °F (36.6 °C)-98.4 °F (36.9 °C)] 98.4 °F (36.9 °C)  Pulse:  [] 77  Resp:   [16-18] 18  SpO2:  [94 %-99 %] 96 %  BP: (124-171)/(61-86) 139/73     Weight: 82.2 kg (181 lb 3.5 oz)  Body mass index is 27.55 kg/m².    Intake/Output Summary (Last 24 hours) at 1/26/2025 0833  Last data filed at 1/26/2025 0610  Gross per 24 hour   Intake 720 ml   Output 2850 ml   Net -2130 ml         Physical Exam  Vitals and nursing note reviewed. Exam conducted with a chaperone present (family).   Constitutional:       General: He is not in acute distress.     Appearance: He is ill-appearing. He is not toxic-appearing.      Interventions: Nasal cannula in place.   HENT:      Head: Normocephalic and atraumatic.   Cardiovascular:      Rate and Rhythm: Normal rate.   Pulmonary:      Effort: Pulmonary effort is normal. Tachypnea present. No respiratory distress.      Breath sounds: Decreased air movement present.   Abdominal:      Palpations: Abdomen is soft.      Tenderness: There is no abdominal tenderness.   Musculoskeletal:      Right lower leg: Edema present.      Left lower leg: Edema present.   Skin:     General: Skin is warm.      Findings: Erythema present.   Neurological:      Mental Status: He is alert. Mental status is at baseline.      Motor: Weakness present.             Significant Labs: All pertinent labs within the past 24 hours have been reviewed.  CBC:   Recent Labs   Lab 01/25/25  0509   WBC 3.29*   HGB 10.1*   HCT 32.5*   *     CMP:   Recent Labs   Lab 01/25/25  0509 01/25/25  1324 01/26/25  0500     --  141   K 3.3*  --  3.5     --  96   CO2 31*  --  33*   *  --  103   BUN 27  --  30   CREATININE 0.8  --  0.9   CALCIUM 8.9  --  8.9   PROT 7.0 7.6  --    ALBUMIN 3.0*  --   --    BILITOT 1.0  --   --    ALKPHOS 95  --   --    AST 20  --   --    ALT 16  --   --    ANIONGAP 12  --  12     Cardiac Markers:   Recent Labs   Lab 01/25/25  0509   *     Lactic Acid: 1.5        Thoracentesis fluid studies  Body fluid protein 3.5  Lights transudate     Significant  Imaging: I have reviewed all pertinent imaging results/findings within the past 24 hours.  CT: I have reviewed all pertinent results/findings within the past 24 hours and my personal findings are:  CTA chest noncoronary, negative for pe  CXR: I have reviewed all pertinent results/findings within the past 24 hours and my personal findings are:  bilateral pleural effusions  U/S: I have reviewed all pertinent results/findings within the past 24 hours and my personal findings are:  chest mediastinum with moderate left pleural effusion  Echocardiogram with 40-45% ejection fraction, grade 3 diastolic dysfunction, severe pulmonary hypertension     Assessment and Plan     * Shortness of breath        Acute on chronic diastolic congestive heart failure  Patient has Combined Systolic and Diastolic heart failure that is Acute on chronic. On presentation their CHF was decompensated. Evidence of decompensated CHF on presentation includes: edema, weight gain, and shortness of breath. The etiology of their decompensation is likely severe pulmonology hypertension . Most recent BNP and echo results are listed below.  Recent Labs     01/24/25  0218 01/25/25  0509   * 236*     Latest ECHO  Results for orders placed during the hospital encounter of 01/25/25    Echo    Interpretation Summary    Left Ventricle: The left ventricle is normal in size. Ventricular mass is normal. Normal wall thickness. There is concentric remodeling. Mild global hypokinesis present. There is mildly reduced systolic function with a visually estimated ejection fraction of 40 - 45%. Grade III diastolic dysfunction.    Right Ventricle: Normal right ventricular cavity size. Wall thickness is normal. Systolic function is reduced.    Left Atrium: Left atrium is moderately dilated.    Right Atrium: Right atrium is dilated.    Aortic Valve: There is mild aortic valve sclerosis.    Tricuspid Valve: There is moderate to severe regurgitation.    Pulmonic Valve:  There is mild regurgitation.    Pulmonary Artery: There is moderate to severe pulmonary hypertension. The estimated pulmonary artery systolic pressure is 64 mmHg.    IVC/SVC: Elevated venous pressure at 15 mmHg.    Pericardium: Bilateral pleural effusions.    Current Heart Failure Medications  losartan tablet 100 mg, Daily, Oral  furosemide injection 40 mg, Every 12 hours, Intravenous    Plan  - Monitor strict I&Os and daily weights.    - Place on telemetry  - Low sodium diet  - Place on fluid restriction of 1.5 L.   - Cardiology has been consulted  - The patient's volume status is improving but not at their baseline as indicated by edema and shortness of breath  - monitor     Chronic respiratory failure with hypoxia  Patient with Hypoxic Respiratory failure which is Acute on chronic.  he is on home oxygen at 2-3  LPM prn. Supplemental oxygen was provided and noted-      .   Signs/symptoms of respiratory failure include- tachypnea, increased work of breathing, respiratory distress, and use of accessory muscles. Contributing diagnoses includes - Pleural effusion Labs and images were reviewed. Patient Has not had a recent ABG. Will treat underlying causes and adjust management of respiratory failure as follows- continue supplemental oxygen and wean as able    Opacity of lung on imaging study  Follow up primary pulmonology outpatient       Atrial fibrillation  Patient has  newly diagnosed  atrial fibrillation last month during previous hospitalization for pneumonia. Patient is currently in sinus rhythm. [id number] Score: 4. The patients heart rate in the last 24 hours is as follows:  Pulse  Min: 56  Max: 113     Antiarrhythmics  -Metoprolol 50 mg b.i.d.    Anticoagulants  -Eliquis 5 mg b.i.d. prescribed     Plan  -Replete lytes with a goal of K>4, Mg >2  -Patient is anticoagulated, see medications listed above.  -Patient's afib is currently controlled  -Continued metoprolol  -Hold Eliquis   Obtain collateral regarding  "prior adverse effects regarding previous prescription for eliquis  Cardiology recommendations resuming eliquis 2.5mg bid if no contraindications  Will resume after thoracentesis and monitor       CAD (coronary artery disease)  Patient with known CAD s/p CABG, which is controlled Will continue  continue home medications, currently holding antiplatelet/anticoagulation therapies pending potential thoracentesis.  and monitor for S/Sx of angina/ACS. Continue to monitor on telemetry.       HLD (hyperlipidemia)  Patient is chronically on statin.will continue for now. Last Lipid Panel: No results found for: "CHOL", "HDL", "LDLCALC", "TRIG", "CHOLHDL"  Plan:  -Continue home medication  -low fat/low calorie diet      Bilateral pleural effusion  Patient found to have large pleural effusion on imaging. I have personally reviewed and interpreted the following imaging: CT. A thoracentesis was deferred. Most likely etiology currently unknown however differentials includes  pneumonia vs CHF . Management to include Diuresis.  Pulmonology consulted and awaiting further evaluation/recommendations regarding therapeutic and diagnostic thoracentesis.  Patient currently saturating 100% on 4 L via nasal cannula in no acute distress without use of accessory muscles noted.  Plan:  -Continue home medications, titrate as needed  -Titrate oxygen requirements as needed  -Incentive spirometry   -Monitor pulse oximetry  -Marcio prn  -Continue p.r.n. Lasix  -f/u pulmonology     Status post left thoracentesis  Repeat right thoracentesis today per pulmonology       Neuropathy  Currently on gabapentin 200 mg b.i.d..  Plan:  -continue home medication      Hypertension  Chronic, controlled.  Latest blood pressure and vitals reviewed-   Temp:  [97.8 °F (36.6 °C)-98.4 °F (36.9 °C)]   Pulse:  []   Resp:  [16-18]   BP: (124-171)/(61-86)   SpO2:  [94 %-99 %] .   Home meds for hypertension were reviewed and noted below.   Hypertension Medications       " "        amLODIPine (NORVASC) 10 MG tablet Take 1 tablet (10 mg total) by mouth every evening.    hydroCHLOROthiazide (HYDRODIURIL) 25 MG tablet Take 25 mg by mouth once daily.    irbesartan (AVAPRO) 300 MG tablet Take 300 mg by mouth once daily.    metoprolol tartrate (LOPRESSOR) 50 MG tablet Take 1 tablet (50 mg total) by mouth 2 (two) times daily.    nitroGLYCERIN (NITROSTAT) 0.4 MG SL tablet Place 0.4 mg under the tongue every 5 (five) minutes as needed for Chest pain.     While in the hospital, will manage blood pressure as follows; Continue home antihypertensive regimen    Will utilize p.r.n. blood pressure medication only if patient's blood pressure greater than  180/110 and he develops symptoms such as worsening chest pain or shortness of breath.      Type 2 diabetes mellitus, without long-term current use of insulin  Patient's FSGs are controlled on current medication regimen.  Last A1c reviewed-   Lab Results   Component Value Date    HGBA1C 6.9 (H) 01/25/2025     Most recent fingerstick glucose reviewed- No results for input(s): "POCTGLUCOSE" in the last 24 hours.  Current correctional scale  Low  Titrate as needed  anti-hyperglycemic dose as follows-   Antihyperglycemics (From admission, onward)      None        Plan:  -SSI  -A1c  -Accu-checks  -Hold oral hypoglycemics while patient is in the hospital  -Hypoglycemic protocol          VTE Risk Mitigation (From admission, onward)           Ordered     Reason for No Pharmacological VTE Prophylaxis  Once        Question:  Reasons:  Answer:  Physician Provided (leave comment)  Comment:  Pending procedure    01/25/25 0147     IP VTE HIGH RISK PATIENT  Once         01/25/25 0147     Place sequential compression device  Until discontinued         01/25/25 0147                    Discharge Planning   VICKIE:      Code Status: Full Code   Medical Readiness for Discharge Date:                            Fredis Yanez MD  Department of Hospital Medicine   [facility] - " Telemetry ([facility])

## 2025-01-26 NOTE — ASSESSMENT & PLAN NOTE
Patient with Hypoxic Respiratory failure which is Acute on chronic.  he is on home oxygen at 2-3  LPM prn. Supplemental oxygen was provided and noted-      .   Signs/symptoms of respiratory failure include- tachypnea, increased work of breathing, respiratory distress, and use of accessory muscles. Contributing diagnoses includes - Pleural effusion Labs and images were reviewed. Patient Has not had a recent ABG. Will treat underlying causes and adjust management of respiratory failure as follows- continue supplemental oxygen and wean as able

## 2025-01-26 NOTE — ASSESSMENT & PLAN NOTE
Patient has Diastolic (HFpEF) heart failure that is Acute on chronic. On presentation their CHF was decompensated. Evidence of decompensated CHF on presentation includes: edema. The etiology of their decompensation is likely dietary indiscretion. Most recent BNP and echo results are listed below.  Recent Labs     01/24/25  0218 01/25/25  0509   * 236*       Latest ECHO  No results found for this or any previous visit.    Current Heart Failure Medications  losartan tablet 100 mg, Daily, Oral  furosemide injection 40 mg, Every 12 hours, Intravenous    Plan  - Monitor strict I&Os and daily weights.    - Place on telemetry  - Low sodium diet  - Place on fluid restriction of 1.5 L.   - Cardiology has been consulted  - The patient's volume status is improving but not at their baseline as indicated by edema    - increase lasix to 40 mg bid ivp and d/c HCTZ  - increase lopressor to 100 mg bid  - continue amlodipine and losartan    01/26/2025  Improved after thoracentesis  Continue lasix 40 mg ivp bid, BB losartan

## 2025-01-26 NOTE — SUBJECTIVE & OBJECTIVE
Interval History: See hospital course for today      Review of Systems   Constitutional:  Positive for activity change (improving), appetite change (improving) and fatigue. Negative for fever.   HENT:  Positive for trouble swallowing.    Respiratory:  Positive for shortness of breath (improving).    Cardiovascular:  Positive for leg swelling (improving).   Gastrointestinal:  Positive for abdominal distention. Negative for abdominal pain, nausea and vomiting.   Skin:  Positive for wound.   Neurological:  Positive for weakness.   Psychiatric/Behavioral:  Negative for agitation, behavioral problems, confusion, decreased concentration and dysphoric mood. The patient is not nervous/anxious.      Objective:     Vital Signs (Most Recent):  Temp: 98.4 °F (36.9 °C) (01/26/25 0734)  Pulse: 77 (01/26/25 0734)  Resp: 18 (01/26/25 0734)  BP: 139/73 (01/26/25 0734)  SpO2: 96 % (01/26/25 0734) Vital Signs (24h Range):  Temp:  [97.8 °F (36.6 °C)-98.4 °F (36.9 °C)] 98.4 °F (36.9 °C)  Pulse:  [] 77  Resp:  [16-18] 18  SpO2:  [94 %-99 %] 96 %  BP: (124-171)/(61-86) 139/73     Weight: 82.2 kg (181 lb 3.5 oz)  Body mass index is 27.55 kg/m².    Intake/Output Summary (Last 24 hours) at 1/26/2025 0833  Last data filed at 1/26/2025 0610  Gross per 24 hour   Intake 720 ml   Output 2850 ml   Net -2130 ml         Physical Exam  Vitals and nursing note reviewed. Exam conducted with a chaperone present (family).   Constitutional:       General: He is not in acute distress.     Appearance: He is ill-appearing. He is not toxic-appearing.      Interventions: Nasal cannula in place.   HENT:      Head: Normocephalic and atraumatic.   Cardiovascular:      Rate and Rhythm: Normal rate.   Pulmonary:      Effort: Pulmonary effort is normal. Tachypnea present. No respiratory distress.      Breath sounds: Decreased air movement present.   Abdominal:      Palpations: Abdomen is soft.      Tenderness: There is no abdominal tenderness.    Musculoskeletal:      Right lower leg: Edema present.      Left lower leg: Edema present.   Skin:     General: Skin is warm.      Findings: Erythema present.   Neurological:      Mental Status: He is alert. Mental status is at baseline.      Motor: Weakness present.             Significant Labs: All pertinent labs within the past 24 hours have been reviewed.  CBC:   Recent Labs   Lab 01/25/25  0509   WBC 3.29*   HGB 10.1*   HCT 32.5*   *     CMP:   Recent Labs   Lab 01/25/25  0509 01/25/25  1324 01/26/25  0500     --  141   K 3.3*  --  3.5     --  96   CO2 31*  --  33*   *  --  103   BUN 27  --  30   CREATININE 0.8  --  0.9   CALCIUM 8.9  --  8.9   PROT 7.0 7.6  --    ALBUMIN 3.0*  --   --    BILITOT 1.0  --   --    ALKPHOS 95  --   --    AST 20  --   --    ALT 16  --   --    ANIONGAP 12  --  12     Cardiac Markers:   Recent Labs   Lab 01/25/25  0509   *     Lactic Acid: 1.5        Thoracentesis fluid studies  Body fluid protein 3.5  Lights transudate     Significant Imaging: I have reviewed all pertinent imaging results/findings within the past 24 hours.  CT: I have reviewed all pertinent results/findings within the past 24 hours and my personal findings are:  CTA chest noncoronary, negative for pe  CXR: I have reviewed all pertinent results/findings within the past 24 hours and my personal findings are:  bilateral pleural effusions  U/S: I have reviewed all pertinent results/findings within the past 24 hours and my personal findings are:  chest mediastinum with moderate left pleural effusion  Echocardiogram with 40-45% ejection fraction, grade 3 diastolic dysfunction, severe pulmonary hypertension

## 2025-01-26 NOTE — ASSESSMENT & PLAN NOTE
Patient has Combined Systolic and Diastolic heart failure that is Acute on chronic. On presentation their CHF was decompensated. Evidence of decompensated CHF on presentation includes: edema and weight gain. The etiology of their decompensation is likely increased fluid intake. Most recent BNP and echo results are listed below.  Recent Labs     01/24/25  0218 01/25/25  0509   * 236*     Latest ECHO  Results for orders placed during the hospital encounter of 01/25/25    Echo    Interpretation Summary    Left Ventricle: The left ventricle is normal in size. Ventricular mass is normal. Normal wall thickness. There is concentric remodeling. Mild global hypokinesis present. There is mildly reduced systolic function with a visually estimated ejection fraction of 40 - 45%. Grade III diastolic dysfunction.    Right Ventricle: Normal right ventricular cavity size. Wall thickness is normal. Systolic function is reduced.    Left Atrium: Left atrium is moderately dilated.    Right Atrium: Right atrium is dilated.    Aortic Valve: There is mild aortic valve sclerosis.    Tricuspid Valve: There is moderate to severe regurgitation.    Pulmonic Valve: There is mild regurgitation.    Pulmonary Artery: There is moderate to severe pulmonary hypertension. The estimated pulmonary artery systolic pressure is 64 mmHg.    IVC/SVC: Elevated venous pressure at 15 mmHg.    Pericardium: Bilateral pleural effusions.    Current Heart Failure Medications  losartan tablet 100 mg, Daily, Oral  furosemide injection 40 mg, Every 12 hours, Intravenous    Plan  - Monitor strict I&Os and daily weights.    - Place on telemetry  - Low sodium diet  - Place on fluid restriction of 1.5 L.   - Cardiology has been consulted  - The patient's volume status is improving but not at their baseline as indicated by weight gain and dyspnea on exertion (GOMEZ)  - continue diuresis

## 2025-01-26 NOTE — ASSESSMENT & PLAN NOTE
Patient has Combined Systolic and Diastolic heart failure that is Acute on chronic. On presentation their CHF was decompensated. Evidence of decompensated CHF on presentation includes: edema, weight gain, and shortness of breath. The etiology of their decompensation is likely severe pulmonology hypertension . Most recent BNP and echo results are listed below.  Recent Labs     01/24/25  0218 01/25/25  0509   * 236*     Latest ECHO  Results for orders placed during the hospital encounter of 01/25/25    Echo    Interpretation Summary    Left Ventricle: The left ventricle is normal in size. Ventricular mass is normal. Normal wall thickness. There is concentric remodeling. Mild global hypokinesis present. There is mildly reduced systolic function with a visually estimated ejection fraction of 40 - 45%. Grade III diastolic dysfunction.    Right Ventricle: Normal right ventricular cavity size. Wall thickness is normal. Systolic function is reduced.    Left Atrium: Left atrium is moderately dilated.    Right Atrium: Right atrium is dilated.    Aortic Valve: There is mild aortic valve sclerosis.    Tricuspid Valve: There is moderate to severe regurgitation.    Pulmonic Valve: There is mild regurgitation.    Pulmonary Artery: There is moderate to severe pulmonary hypertension. The estimated pulmonary artery systolic pressure is 64 mmHg.    IVC/SVC: Elevated venous pressure at 15 mmHg.    Pericardium: Bilateral pleural effusions.    Current Heart Failure Medications  losartan tablet 100 mg, Daily, Oral  furosemide injection 40 mg, Every 12 hours, Intravenous    Plan  - Monitor strict I&Os and daily weights.    - Place on telemetry  - Low sodium diet  - Place on fluid restriction of 1.5 L.   - Cardiology has been consulted  - The patient's volume status is improving but not at their baseline as indicated by edema and shortness of breath  - monitor

## 2025-01-26 NOTE — PLAN OF CARE
A238/A238 JIMBOJayesh Leija is a 89 y.o.male admitted on 1/25/2025 for Shortness of breath   Code Status: Full Code MRN: [id number]   Review of patient's allergies indicates:  No Known Allergies  Past Medical History:   Diagnosis Date    Bleeding from the nose     R/T ASPIRIN USE    CHF (congestive heart failure)     Coronary artery disease     Diabetes mellitus     [place] (hard of hearing)     Hyperlipidemia     Hypertension     Osteoarthritis     PAD (peripheral artery disease)     Paroxysmal atrial fibrillation     Wears dentures     UPPER       PRN meds    acetaminophen, 650 mg, Q8H PRN  acetaminophen, 650 mg, Q4H PRN  ondansetron, 4 mg, Q12H PRN  polyethylene glycol, 17 g, Daily PRN  promethazine, 25 mg, Q6H PRN  sodium chloride 0.9%, 10 mL, PRN  sodium chloride 0.9%, 10 mL, PRN      Chart check completed. Will continue plan of care.         Rani Coma Scale Score: 15     Lead Monitored: Lead II Rhythm: atrial rhythm    Cardiac/Telemetry Box Number: [id number]  VTE Core Measure: (SCDs) Sequential compression device initiated/maintained Last Bowel Movement: 01/24/25  Diet Cardiac Minced & Moist (IDDSI Level 5); Fluid - 1500mL  Voiding Characteristics: voids spontaneously without difficulty  Bashir Score: 16  Fall Risk Score: 16  Accucheck []   Freq?      Lines/Drains/Airways       Peripheral Intravenous Line  Duration                  Peripheral IV - Single Lumen 01/24/25 0907 18 G Anterior;Left;Proximal Forearm 1 day

## 2025-01-26 NOTE — ASSESSMENT & PLAN NOTE
Chronic, controlled.  Latest blood pressure and vitals reviewed-   Temp:  [97.8 °F (36.6 °C)-98.4 °F (36.9 °C)]   Pulse:  []   Resp:  [16-18]   BP: (124-171)/(61-86)   SpO2:  [94 %-99 %] .   Home meds for hypertension were reviewed and noted below.   Hypertension Medications               amLODIPine (NORVASC) 10 MG tablet Take 1 tablet (10 mg total) by mouth every evening.    hydroCHLOROthiazide (HYDRODIURIL) 25 MG tablet Take 25 mg by mouth once daily.    irbesartan (AVAPRO) 300 MG tablet Take 300 mg by mouth once daily.    metoprolol tartrate (LOPRESSOR) 50 MG tablet Take 1 tablet (50 mg total) by mouth 2 (two) times daily.    nitroGLYCERIN (NITROSTAT) 0.4 MG SL tablet Place 0.4 mg under the tongue every 5 (five) minutes as needed for Chest pain.     While in the hospital, will manage blood pressure as follows; Continue home antihypertensive regimen    Will utilize p.r.n. blood pressure medication only if patient's blood pressure greater than  180/110 and he develops symptoms such as worsening chest pain or shortness of breath.

## 2025-01-26 NOTE — ASSESSMENT & PLAN NOTE
Patient found to have moderate pleural effusion on imaging. I have personally reviewed and interpreted the following imaging: CT. A thoracentesis was ordered. Most likely etiology includes Congestive Heart Failure. Management to include Diuresis and thoracenteis    Status post left thoracentesis 1000 mls:  Transudative characteristics  Planned right thoracentesis

## 2025-01-26 NOTE — SUBJECTIVE & OBJECTIVE
Interval History:   01/26/2025:  Postprocedure x-ray reviewed , plan for right-sided thoracentesis., echo reviewed, afebrile, oxygen 4 liters/minute      Objective:     Vital Signs (Most Recent):  Temp: 98.4 °F (36.9 °C) (01/26/25 0734)  Pulse: 77 (01/26/25 0734)  Resp: 18 (01/26/25 0734)  BP: 139/73 (01/26/25 0734)  SpO2: 96 % (01/26/25 0734) Vital Signs (24h Range):  Temp:  [97.8 °F (36.6 °C)-98.4 °F (36.9 °C)] 98.4 °F (36.9 °C)  Pulse:  [] 77  Resp:  [16-18] 18  SpO2:  [94 %-99 %] 96 %  BP: (124-171)/(61-86) 139/73     Weight: 82.2 kg (181 lb 3.5 oz)  Body mass index is 27.55 kg/m².      Intake/Output Summary (Last 24 hours) at 1/26/2025 0854  Last data filed at 1/26/2025 0610  Gross per 24 hour   Intake 720 ml   Output 2850 ml   Net -2130 ml        Physical Exam  Vitals and nursing note reviewed.   Constitutional:       Appearance: He is well-developed.   HENT:      Head: Normocephalic and atraumatic.      Nose: Nose normal.   Eyes:      Pupils: Pupils are equal, round, and reactive to light.   Cardiovascular:      Rate and Rhythm: Normal rate and regular rhythm.      Heart sounds: Normal heart sounds.   Pulmonary:      Breath sounds: Decreased air movement present. Examination of the right-lower field reveals decreased breath sounds. Examination of the left-lower field reveals decreased breath sounds. Decreased breath sounds present.   Abdominal:      Palpations: Abdomen is soft.   Musculoskeletal:         General: Swelling present. Normal range of motion.      Cervical back: Normal range of motion and neck supple.   Skin:     General: Skin is warm and dry.      Capillary Refill: Capillary refill takes 2 to 3 seconds.   Neurological:      Mental Status: He is alert and oriented to person, place, and time.      Cranial Nerves: No cranial nerve deficit.           Review of Systems   Respiratory:  Positive for shortness of breath.    Neurological:  Positive for weakness.       Vents:        Lines/Drains/Airways       Peripheral Intravenous Line  Duration                  Peripheral IV - Single Lumen 01/24/25 0907 18 G Anterior;Left;Proximal Forearm 1 day                    Significant Labs:    CBC/Anemia Profile:  Recent Labs   Lab 01/25/25  0509   WBC 3.29*   HGB 10.1*   HCT 32.5*   *   *   RDW 16.0*        Chemistries:  Recent Labs   Lab 01/25/25  0509 01/25/25  1324 01/26/25  0500     --  141   K 3.3*  --  3.5     --  96   CO2 31*  --  33*   BUN 27  --  30   CREATININE 0.8  --  0.9   CALCIUM 8.9  --  8.9   ALBUMIN 3.0*  --   --    PROT 7.0 7.6  --    BILITOT 1.0  --   --    ALKPHOS 95  --   --    ALT 16  --   --    AST 20  --   --    MG 1.6  --   --      Component      Latest Ref Rng 1/25/2025   Body Fluid Type Thoracentesis Fluid    Fluid Appearance Clear    Fluid Color Yellow    WBC, Body Fluid      /cu mm 1107    Lymphs, Fluid      % 92    Monocytes/Macrophages, Fluid      % 8    Body Fluid Source, Total Protein Thoracentesis Fluid    Body Fluid, Protein      Not established g/dL 3.5    Body Fluid Source, Albumin Thoracentesis Fluid    Body Fluid, Albumin      See text g/dL 1.8    Body Fluid Source, LDH Thoracentesis Fluid    LD, Fluid      Not established U/L 106    Body Fluid Source, Glucose Thoracentesis Fluid    Glucose Body Fluid      Not established mg/dL 139    Body Fluid Source Amylase Thoracentesis Fluid    Amylase, Fluid      Not established U/L 37    BNP      0 - 99 pg/mL 236 (H)    Lactate Dehydrogenase      110 - 260 U/L 209    PROTEIN TOTAL      6.0 - 8.4 g/dL 7.6       Legend:  (H) High    All pertinent labs within the past 24 hours have been reviewed.    Significant Imaging:  I have reviewed all pertinent imaging results/findings within the past 24 hours.    X-Ray Chest 1 View  Narrative: EXAMINATION:  XR CHEST 1 VIEW    CLINICAL HISTORY:  followup;    TECHNIQUE:  Single frontal view of the chest was performed.    COMPARISON:  None    FINDINGS:  Some  improvement in right-sided opacities with enlargement of left-sided consolidation.  Bilateral pleural effusions suspected.    The cardiac silhouette is enlarged.  The hilar and mediastinal contours are unremarkable.    Bones are intact.  Impression: As above.    Electronically signed by: John Colon  Date:    01/26/2025  Time:    06:06       Summary  Show Result Comparison     Left Ventricle: The left ventricle is normal in size. Ventricular mass is normal. Normal wall thickness. There is concentric remodeling. Mild global hypokinesis present. There is mildly reduced systolic function with a visually estimated ejection fraction of 40 - 45%. Grade III diastolic dysfunction.    Right Ventricle: Normal right ventricular cavity size. Wall thickness is normal. Systolic function is reduced.    Left Atrium: Left atrium is moderately dilated.    Right Atrium: Right atrium is dilated.    Aortic Valve: There is mild aortic valve sclerosis.    Tricuspid Valve: There is moderate to severe regurgitation.    Pulmonic Valve: There is mild regurgitation.    Pulmonary Artery: There is moderate to severe pulmonary hypertension. The estimated pulmonary artery systolic pressure is 64 mmHg.    IVC/SVC: Elevated venous pressure at 15 mmHg.    Pericardium: Bilateral pleural effusions.

## 2025-01-26 NOTE — ASSESSMENT & PLAN NOTE
"Patient's FSGs are controlled on current medication regimen.  Last A1c reviewed-   Lab Results   Component Value Date    HGBA1C 6.9 (H) 01/25/2025     Most recent fingerstick glucose reviewed- No results for input(s): "POCTGLUCOSE" in the last 24 hours.  Current correctional scale  Low  Titrate as needed  anti-hyperglycemic dose as follows-   Antihyperglycemics (From admission, onward)    None      Plan:  -SSI  -A1c  -Accu-checks  -Hold oral hypoglycemics while patient is in the hospital  -Hypoglycemic protocol      "

## 2025-01-26 NOTE — ASSESSMENT & PLAN NOTE
Patient with Hypoxic Respiratory failure which is Acute on chronic.  he is on home oxygen at 3 LPM. Supplemental oxygen was provided and noted-      .   Signs/symptoms of respiratory failure include- tachypnea and increased work of breathing. Contributing diagnoses includes - CHF Labs and images were reviewed. Patient Has not had a recent ABG. Will treat underlying causes and adjust management of respiratory failure as follows-      Status post left thoracentesis in 1000 meals  Right thoracentesis plans today

## 2025-01-26 NOTE — PLAN OF CARE
A238/A238 RUBINA Leija is a 89 y.o.male admitted on 1/25/2025 for Shortness of breath   Code Status: Full Code MRN: [id number]   Review of patient's allergies indicates:  No Known Allergies  Past Medical History:   Diagnosis Date    Bleeding from the nose     R/T ASPIRIN USE    CHF (congestive heart failure)     Coronary artery disease     Diabetes mellitus     [place] (hard of hearing)     Hyperlipidemia     Hypertension     Osteoarthritis     PAD (peripheral artery disease)     Paroxysmal atrial fibrillation     Wears dentures     UPPER       PRN meds    acetaminophen, 650 mg, Q8H PRN  acetaminophen, 650 mg, Q4H PRN  ondansetron, 4 mg, Q12H PRN  polyethylene glycol, 17 g, Daily PRN  promethazine, 25 mg, Q6H PRN  sodium chloride 0.9%, 10 mL, PRN  sodium chloride 0.9%, 10 mL, PRN  sodium chloride 0.9%, 10 mL, PRN      Chart check completed. Will continue plan of care.         Rani Coma Scale Score: 15     Lead Monitored: Lead II Rhythm: atrial rhythm    Cardiac/Telemetry Box Number: [id number]  VTE Core Measure: (TEDs) Compression stocking therapy initiated/maintained, Pharmacological prophylaxis initiated/maintained Last Bowel Movement: 01/24/25  Diet Cardiac Minced & Moist (IDDSI Level 5); Fluid - 1500mL  Voiding Characteristics: voids spontaneously without difficulty  Bashir Score: 16  Fall Risk Score: 16  Accucheck []   Freq?      Lines/Drains/Airways       Peripheral Intravenous Line  Duration                  Peripheral IV - Single Lumen 01/26/25 1641 22 G Anterior;Left Forearm <1 day                     A238/A238 RUBINA Leija is a 89 y.o.male admitted on 1/25/2025 for Shortness of breath   Code Status: Full Code MRN: [id number]   Review of patient's allergies indicates:  No Known Allergies  Past Medical History:   Diagnosis Date    Bleeding from the nose     R/T ASPIRIN USE    CHF (congestive heart failure)     Coronary artery disease     Diabetes mellitus     [place] (hard of hearing)     Hyperlipidemia      Hypertension     Osteoarthritis     PAD (peripheral artery disease)     Paroxysmal atrial fibrillation     Wears dentures     UPPER       PRN meds    acetaminophen, 650 mg, Q8H PRN  acetaminophen, 650 mg, Q4H PRN  ondansetron, 4 mg, Q12H PRN  polyethylene glycol, 17 g, Daily PRN  promethazine, 25 mg, Q6H PRN  sodium chloride 0.9%, 10 mL, PRN  sodium chloride 0.9%, 10 mL, PRN  sodium chloride 0.9%, 10 mL, PRN      Chart check completed. Will continue plan of care.   Pt OOB for all meals.   Weaned to 2L of O2 tolerating well.           [place] Coma Scale Score: 15     Lead Monitored: Lead II Rhythm: atrial rhythm    Cardiac/Telemetry Box Number: [id number]  VTE Core Measure: (TEDs) Compression stocking therapy initiated/maintained, Pharmacological prophylaxis initiated/maintained Last Bowel Movement: 01/24/25  Diet Cardiac Minced & Moist (IDDSI Level 5); Fluid - 1500mL  Voiding Characteristics: voids spontaneously without difficulty  Bashir Score: 16  Fall Risk Score: 16  Accucheck []   Freq?      Lines/Drains/Airways       Peripheral Intravenous Line  Duration                  Peripheral IV - Single Lumen 01/26/25 1641 22 G Anterior;Left Forearm <1 day

## 2025-01-26 NOTE — PROCEDURES
"Hima Leija is a 89 y.o. male patient.    Temp: 98.4 °F (36.9 °C) (01/26/25 0734)  Pulse: 77 (01/26/25 0734)  Resp: 18 (01/26/25 0734)  BP: 139/73 (01/26/25 0734)  SpO2: 96 % (01/26/25 0734)  Weight: 82.2 kg (181 lb 3.5 oz) (01/25/25 1209)  Height: 5' 8" (172.7 cm) (01/25/25 1209)       Thoracentesis    Date/Time: 1/26/2025 9:58 AM  Location procedure was performed: [facility] PULMONARY MEDICINE    Performed by: Talat Koo MD  Authorized by: Talat Koo MD  Pre-operative diagnosis: bilateral pleural effusion  Post-operative diagnosis: right pleural effusion  Procedure purpose: therapeutic and diagnostic  Indications: pleural effusion  Preparation: Patient was prepped and draped in the usual sterile fashion.  Local anesthesia used: yes  Anesthesia: local infiltration    Anesthesia:  Local anesthesia used: yes  Local Anesthetic: lidocaine 1% without epinephrine  Anesthetic total: 10 mL    Patient sedated: no  Preparation: skin prepped with ChloraPrep  Patient position: sitting  Ultrasound guidance: yes  Location: right posterior  Intercostal space: 7th  Puncture method: over-the-needle catheter  Needle size: 18  Catheter size: 8 [place]  Number of attempts: 1  Drainage amount: 950 ml  Drainage characteristics: serosanguinous  Patient tolerance: Patient tolerated the procedure well with no immediate complications  Chest x-ray performed: yes  Chest x-ray interpreted by me.  Chest x-ray findings: normal findings  Complications: No  Estimated blood loss (mL): 0  Specimens: Yes  Implants: No  Comments: Tube #1: Gram stain, culture, KOH, AFB, Tube #2: Chemistry: Alb, Carey, Chol, LDH, Prot, Tube #3: Cytology, Lavender for wbcc, Urine specimen cup for cytology      Drainage Tube: removed      Postprocedure x-ray reviewed no pneumothorax     1/26/2025    "

## 2025-01-26 NOTE — CARE UPDATE
Evaluated patient   Sitting in chair  Granddaughter present  Nursing present    Tolerated 2nd thoracentesis on right  Weaned to 2L  Cardiology recommending resuming 2.5mg bid eliquis  Patient reports stopping eliquis due to epistaxis    Agreeable to monitoring overnight   Repeat hb/hct in am

## 2025-01-26 NOTE — PROGRESS NOTES
[facility] - Telemetry ([facility])  Cardiology  Progress Note    Patient Name: Hima Leija  MRN: [id number]  Admission Date: 1/25/2025  Hospital Length of Stay: 1 days  Code Status: Full Code   Attending Physician: Fredis Yanez MD   Primary Care Physician: Darci Rooney PA  Expected Discharge Date:   Principal Problem:Shortness of breath    Subjective:     Hospital Course:   Cardiology cnsult for CHF exacerbation.  PMHx: CAD s/p CABG, HTN, HLD, PAD, DM type 2, afib Dx in 12/23 when admitted for PNA.  C/o SOB orthopnea and leg swelling    Troponin 0.013  Cr 0.8 HGB 10.1 and , UA -  EKG AFIB VR  LE venous US showed no DVT  Echo in 12/23 EF 45%  Chest ct showed bilateral large pleural effusion L > R and patchy infiltrate      01/26/2025  S/p b/l thoracentesis removal of 1.9 liters. SOB improved, echo showed EF 40% PASP 64 mmHg      ROS  Objective:     Vital Signs (Most Recent):  Temp: 97.8 °F (36.6 °C) (01/26/25 1607)  Pulse: (!) 57 (01/26/25 1726)  Resp: 18 (01/26/25 1607)  BP: 122/63 (01/26/25 1607)  SpO2: 96 % (01/26/25 1726) Vital Signs (24h Range):  Temp:  [97.8 °F (36.6 °C)-98.4 °F (36.9 °C)] 97.8 °F (36.6 °C)  Pulse:  [54-77] 57  Resp:  [16-18] 18  SpO2:  [94 %-99 %] 96 %  BP: (108-142)/(61-78) 122/63     Weight: 81.3 kg (179 lb 3.7 oz)  Body mass index is 27.25 kg/m².     SpO2: 96 %         Intake/Output Summary (Last 24 hours) at 1/26/2025 1749  Last data filed at 1/26/2025 1726  Gross per 24 hour   Intake 990 ml   Output 2100 ml   Net -1110 ml       Lines/Drains/Airways       Peripheral Intravenous Line  Duration                  Peripheral IV - Single Lumen 01/26/25 1641 22 G Anterior;Left Forearm <1 day                       Physical Exam  HENT:      Head: Normocephalic.   Eyes:      Pupils: Pupils are equal, round, and reactive to light.   Neck:      Thyroid: No thyromegaly.      Vascular: Normal carotid pulses. No carotid bruit or JVD.   Cardiovascular:      Rate and Rhythm: Normal  "rate. Rhythm irregularly irregular. No extrasystoles are present.     Chest Wall: PMI is not displaced.      Pulses: Normal pulses.      Heart sounds: Normal heart sounds. No murmur heard.     No gallop. No S3 sounds.   Pulmonary:      Effort: No respiratory distress.      Breath sounds: No stridor. Decreased breath sounds present.   Abdominal:      General: Bowel sounds are normal.      Palpations: Abdomen is soft.      Tenderness: There is no abdominal tenderness. There is no rebound.   Musculoskeletal:         General: Swelling present.   Skin:     General: Skin is warm and dry.      Findings: No rash.   Neurological:      Mental Status: He is alert and oriented to person, place, and time.   Psychiatric:         Behavior: Behavior normal.            Significant Labs: ABG: No results for input(s): "PH", "PCO2", "HCO3", "POCSATURATED", "BE" in the last 48 hours., Blood Culture: No results for input(s): "LABBLOO" in the last 48 hours., BMP:   Recent Labs   Lab 01/25/25  0509 01/26/25  0500   * 103    141   K 3.3* 3.5    96   CO2 31* 33*   BUN 27 30   CREATININE 0.8 0.9   CALCIUM 8.9 8.9   MG 1.6  --    , CMP   Recent Labs   Lab 01/25/25  0509 01/25/25  1324 01/26/25  0500     --  141   K 3.3*  --  3.5     --  96   CO2 31*  --  33*   *  --  103   BUN 27  --  30   CREATININE 0.8  --  0.9   CALCIUM 8.9  --  8.9   PROT 7.0 7.6  --    ALBUMIN 3.0*  --   --    BILITOT 1.0  --   --    ALKPHOS 95  --   --    AST 20  --   --    ALT 16  --   --    ANIONGAP 12  --  12   , CBC   Recent Labs   Lab 01/25/25  0509   WBC 3.29*   HGB 10.1*   HCT 32.5*   *   , INR No results for input(s): "INR", "PROTIME" in the last 48 hours., Lipid Panel No results for input(s): "CHOL", "HDL", "LDLCALC", "TRIG", "CHOLHDL" in the last 48 hours., and Troponin No results for input(s): "TROPONINIHS" in the last 48 hours.    Significant Imaging: EKG:    Assessment and Plan:       Acute on chronic diastolic " congestive heart failure  Patient has Diastolic (HFpEF) heart failure that is Acute on chronic. On presentation their CHF was decompensated. Evidence of decompensated CHF on presentation includes: edema. The etiology of their decompensation is likely dietary indiscretion. Most recent BNP and echo results are listed below.  Recent Labs     01/24/25  0218 01/25/25  0509   * 236*       Latest ECHO  No results found for this or any previous visit.    Current Heart Failure Medications  losartan tablet 100 mg, Daily, Oral  furosemide injection 40 mg, Every 12 hours, Intravenous    Plan  - Monitor strict I&Os and daily weights.    - Place on telemetry  - Low sodium diet  - Place on fluid restriction of 1.5 L.   - Cardiology has been consulted  - The patient's volume status is improving but not at their baseline as indicated by edema    - increase lasix to 40 mg bid ivp and d/c HCTZ  - increase lopressor to 100 mg bid  - continue amlodipine and losartan    01/26/2025  Improved after thoracentesis  Continue lasix 40 mg ivp bid, BB losartan      Atrial fibrillation  AFIB with RVR    - increase Lopressor to 100 mg bid  - advise to START HEPARIN GTT IF NO HIGH BLEEDING RISK    01/26/2025  AFIB VR C  Continue eliquis and BB     CAD (coronary artery disease)  CONTINUE STATIN    Bilateral pleural effusion  Rx per     Type 2 diabetes mellitus, without long-term current use of insulin  RX PER         VTE Risk Mitigation (From admission, onward)           Ordered     apixaban tablet 2.5 mg  2 times daily         01/26/25 1350     Reason for No Pharmacological VTE Prophylaxis  Once        Question:  Reasons:  Answer:  Physician Provided (leave comment)  Comment:  Pending procedure    01/25/25 0147     IP VTE HIGH RISK PATIENT  Once         01/25/25 0147     Place sequential compression device  Until discontinued         01/25/25 0147                    Tee Redd MD  Cardiology  [facility] - Tele[facility] ([facility])

## 2025-01-27 ENCOUNTER — TELEPHONE (OUTPATIENT)
Dept: PULMONOLOGY | Facility: CLINIC | Age: OVER 89
End: 2025-01-27
Payer: MEDICARE

## 2025-01-27 VITALS
TEMPERATURE: 99 F | SYSTOLIC BLOOD PRESSURE: 108 MMHG | HEIGHT: 68 IN | OXYGEN SATURATION: 97 % | WEIGHT: 186.94 LBS | BODY MASS INDEX: 28.33 KG/M2 | RESPIRATION RATE: 18 BRPM | DIASTOLIC BLOOD PRESSURE: 59 MMHG | HEART RATE: 59 BPM

## 2025-01-27 LAB
ACID FAST MOD KINY STN SPEC: NORMAL
ANION GAP SERPL CALC-SCNC: 10 MMOL/L (ref 8–16)
BUN SERPL-MCNC: 36 MG/DL (ref 10–30)
CALCIUM SERPL-MCNC: 8.4 MG/DL (ref 8.7–10.5)
CHLORIDE SERPL-SCNC: 95 MMOL/L (ref 95–110)
CO2 SERPL-SCNC: 34 MMOL/L (ref 23–29)
CREAT SERPL-MCNC: 1 MG/DL (ref 0.5–1.4)
EST. GFR  (NO RACE VARIABLE): >60 ML/MIN/1.73 M^2
GLUCOSE SERPL-MCNC: 137 MG/DL (ref 70–110)
HCT VFR BLD AUTO: 33.8 % (ref 40–54)
HGB BLD-MCNC: 10.6 G/DL (ref 14–18)
MYCOBACTERIUM SPEC QL CULT: NORMAL
POTASSIUM SERPL-SCNC: 4 MMOL/L (ref 3.5–5.1)
SODIUM SERPL-SCNC: 139 MMOL/L (ref 136–145)

## 2025-01-27 PROCEDURE — 85018 HEMOGLOBIN: CPT | Performed by: FAMILY MEDICINE

## 2025-01-27 PROCEDURE — 36415 COLL VENOUS BLD VENIPUNCTURE: CPT | Performed by: HOSPITALIST

## 2025-01-27 PROCEDURE — 63700000 PHARM REV CODE 250 ALT 637 W/O HCPCS: Performed by: INTERNAL MEDICINE

## 2025-01-27 PROCEDURE — 63600175 PHARM REV CODE 636 W HCPCS: Performed by: INTERNAL MEDICINE

## 2025-01-27 PROCEDURE — 80048 BASIC METABOLIC PNL TOTAL CA: CPT | Performed by: HOSPITALIST

## 2025-01-27 PROCEDURE — 25000003 PHARM REV CODE 250: Performed by: HOSPITALIST

## 2025-01-27 PROCEDURE — 25000003 PHARM REV CODE 250: Performed by: FAMILY MEDICINE

## 2025-01-27 PROCEDURE — 85014 HEMATOCRIT: CPT | Performed by: FAMILY MEDICINE

## 2025-01-27 RX ORDER — METOPROLOL TARTRATE 100 MG/1
100 TABLET ORAL 2 TIMES DAILY
Qty: 60 TABLET | Refills: 0 | Status: ON HOLD | OUTPATIENT
Start: 2025-01-27 | End: 2025-02-06 | Stop reason: HOSPADM

## 2025-01-27 RX ORDER — ASPIRIN 81 MG/1
81 TABLET ORAL DAILY
Qty: 30 TABLET | Refills: 0 | Status: SHIPPED | OUTPATIENT
Start: 2025-01-27 | End: 2026-01-27

## 2025-01-27 RX ORDER — POTASSIUM CHLORIDE 750 MG/1
10 TABLET, EXTENDED RELEASE ORAL DAILY
Qty: 30 TABLET | Refills: 0 | Status: SHIPPED | OUTPATIENT
Start: 2025-01-27

## 2025-01-27 RX ORDER — FUROSEMIDE 40 MG/1
40 TABLET ORAL DAILY
Qty: 30 TABLET | Refills: 0 | Status: ON HOLD | OUTPATIENT
Start: 2025-01-27 | End: 2025-02-06

## 2025-01-27 RX ADMIN — ATORVASTATIN CALCIUM 40 MG: 40 TABLET, FILM COATED ORAL at 09:01

## 2025-01-27 RX ADMIN — POTASSIUM CHLORIDE 40 MEQ: 1500 TABLET, EXTENDED RELEASE ORAL at 09:01

## 2025-01-27 RX ADMIN — CEFTRIAXONE 1 G: 1 INJECTION, POWDER, FOR SOLUTION INTRAMUSCULAR; INTRAVENOUS at 09:01

## 2025-01-27 RX ADMIN — AZITHROMYCIN DIHYDRATE 250 MG: 250 TABLET ORAL at 09:01

## 2025-01-27 RX ADMIN — MAGNESIUM OXIDE TAB 400 MG (241.3 MG ELEMENTAL MG) 400 MG: 400 (241.3 MG) TAB at 09:01

## 2025-01-27 NOTE — PROGRESS NOTES
AVS virtually reviewed with patient and [facility] in its entirety with emphasis on diet, medications, follow-up appointments and reasons to return to the ED or contact the [facility] On Call Nurse Care Line. Patient also encouraged to utilize their patient portal. Ease and convenience of use reiterated. Education complete and patient voiced understanding. All questions answered. Discharge teaching complete.

## 2025-01-27 NOTE — PLAN OF CARE
[facility] - Telemetry (Hospital)  Discharge Final Note    Primary Care Provider: Darci Rooney PA    Expected Discharge Date: 1/27/2025    Final Discharge Note (most recent)       Final Note - 01/27/25 1034          Final Note    Assessment Type Final Discharge Note     Anticipated Discharge Disposition Home or Self Care     Hospital Resources/Appts/Education Provided Post-Acute resouces added to AVS        Post-Acute Status    Discharge Delays None known at this time                   Pt to discharge home today. Granddaughter to transport home; family to arrange follow up appointment with PCP.     Important Message from Medicare  Important Message from Medicare regarding Discharge Appeal Rights: Given to patient/caregiver, Explained to patient/caregiver, Signed/date by patient/caregiver     Date IMM was signed: 01/27/25  Time IMM was signed: 1033    Contact Info       Darci Rooney PA   Specialty: General Practice, Family Medicine   Relationship: PCP - General    [place] [facility]  [address]  [address]RD FLOOR  [place]  [place] [address]   Phone: [phone]       Next Steps: Follow up in 3 day(s)    [facility]   Specialty: Cardiology    [address] [address]   Phone: [phone]       Next Steps: Follow up in 1 week(s)    [facility]   Specialty: Pulmonology    [address] [address]   Phone: [phone]       Next Steps: Follow up in 1 week(s)    Otolaryngology   Specialty: Otolaryngology    [address] [address]   Phone: [phone]       Next Steps: Follow up in 1 week(s)

## 2025-01-27 NOTE — DISCHARGE INSTRUCTIONS
Our goal at [facility] is to always give you outstanding care and exceptional service. You may receive a survey by mail, text or e-mail in the next 7-10 days from Konstantin Degroot and our leadership team asking about the care you received with us. The survey should only take 5-10 minutes to complete and is very important to us.     Your feedback provides us with a way to recognize our staff who work tirelessly to provide the best care! Also, your responses help us learn how to improve when your experience was below our aspiration of excellence. We WILL use your feedback to continue making improvements to help us provide the highest quality care. We keep your personal information and feedback confidential. We appreciate your time completing this survey and can't wait to hear from you!!!     We want you to leave us today feeling like you can DEFINITELY recommend us to others! We look forward to your continued care with us! Thanks so much for choosing [facility] for your healthcare needs!

## 2025-01-27 NOTE — TELEPHONE ENCOUNTER
----- Message from Stacy sent at 1/27/2025  3:08 PM CST -----  Regarding: Appointment  Contact: Ap, grand daughter  Per phone call with Ap, she stated that she is needing to schedule an appointment to see the physician regarding a Hospital Follow Up to be seen in one week.  Please return call at [phone].    Thanks,  SJ

## 2025-01-27 NOTE — PLAN OF CARE
POC reviewed with patient and granddaughter. Verbalizes understanding of POC. No questions at this time.   AAOx4.   In NAD.   Controlled Afib on TELE.  Pt remains free of falls. Bed alarm set for patient safety.  Informed patient to call for assistance before ambulating and patient verbalizes understanding.  Purposeful rounding complete; see chart.  Meds given; see MAR.        Problem: Adult Inpatient Plan of Care  Goal: Plan of Care Review  Outcome: Progressing  Flowsheets (Taken 1/27/2025 0123)  Plan of Care Reviewed With: patient  Goal: Patient-Specific Goal (Individualized)  Outcome: Progressing  Flowsheets (Taken 1/27/2025 0123)  Individualized Care Needs: vss, free from injury, rest, diuresis  Anxieties, Fears or Concerns: none  Patient/Family-Specific Goals (Include Timeframe): rtb  Goal: Absence of Hospital-Acquired Illness or Injury  Outcome: Progressing  Goal: Optimal Comfort and Wellbeing  Outcome: Progressing  Goal: Readiness for Transition of Care  Outcome: Progressing     Problem: Diabetes Comorbidity  Goal: Blood Glucose Level Within Targeted Range  Outcome: Progressing     Problem: Fall Injury Risk  Goal: Absence of Fall and Fall-Related Injury  Outcome: Progressing     Problem: Skin Injury Risk Increased  Goal: Skin Health and Integrity  Outcome: Progressing     Problem: Gas Exchange Impaired  Goal: Optimal Gas Exchange  Outcome: Progressing     Problem: Fatigue  Goal: Improved Activity Tolerance  Outcome: Progressing     Problem: Heart Failure  Goal: Optimal Coping  Outcome: Progressing  Goal: Optimal Cardiac Output  Outcome: Progressing  Goal: Stable Heart Rate and Rhythm  Outcome: Progressing  Goal: Optimal Functional Ability  Outcome: Progressing  Goal: Fluid and Electrolyte Balance  Outcome: Progressing  Goal: Improved Oral Intake  Outcome: Progressing  Goal: Effective Oxygenation and Ventilation  Outcome: Progressing  Goal: Effective Breathing Pattern During Sleep  Outcome: Progressing

## 2025-01-27 NOTE — PLAN OF CARE
[facility] - Telemetry (Hospital)  Initial Discharge Assessment       Primary Care Provider: Darci Rooney PA    Admission Diagnosis: Pleural effusion [J90]    Admission Date: 1/25/2025  Expected Discharge Date: 1/27/2025    Transition of Care Barriers: None    Payor: HU[employer] MANAGED MEDICARE / Plan: HU[employer] MEDICARE PPO / Product Type: Medicare Advantage /     Extended Emergency Contact Information  Primary Emergency Contact: Ele Leija  Mobile Phone: [phone]  Relation: Grandchild  Secondary Emergency Contact: Efren Leija   [facility]  Home Phone: [phone]  Relation: Grandchild    Discharge Plan A: Home, Home with family  Discharge Plan B: Home, Home with family      [facility] [place] [facility] - [address] [facility] [address] [address] [address]  [place] [address]  Phone: [phone] Fax: [phone]      Initial Assessment (most recent)       Adult Discharge Assessment - 01/27/25 0952          Discharge Assessment    Assessment Type Discharge Planning Assessment     Confirmed/corrected address, phone number and insurance Yes     Confirmed Demographics Correct on Facesheet     Source of Information patient;family     Communicated VICKIE with patient/caregiver Yes     Reason For Admission SOB     People in Home grandchild(rashida)     Do you expect to return to your current living situation? Yes     Do you have help at home or someone to help you manage your care at home? Yes     Who are your caregiver(s) and their phone number(s)? Ele Birch     Prior to hospitilization cognitive status: Alert/Oriented     Current cognitive status: Alert/Oriented     Walking or Climbing Stairs Difficulty yes     Walking or Climbing Stairs ambulation difficulty, requires equipment     Mobility Management cane     Equipment Currently Used at Home cane, straight;oxygen     Readmission within 30 days? No     Patient currently being followed by outpatient case management? No     Do you currently have  service(s) that help you manage your care at home? No     Do you take prescription medications? Yes     Do you have prescription coverage? Yes     Do you have any problems affording any of your prescribed medications? No     Is the patient taking medications as prescribed? yes     How do you get to doctors appointments? family or friend will provide     Are you on dialysis? No     Do you take coumadin? No     Discharge Plan A Home;Home with family     Discharge Plan B Home;Home with family     DME Needed Upon Discharge  none     Discharge Plan discussed with: Patient   Ele avalos    Transition of Care Barriers None                   Anticipated DC dispo: Home with family  Prior Level of Function: Lives at home with Ele avalos. Ambulates with cane and has home O2; POC at bedside.   PCP: MD Toribio      Comments: Plan to DC home today; no needs expressed. Pt's whiteboard updated with CM contact and anticipated discharge disposition. SW to remain available as needed.

## 2025-01-27 NOTE — PLAN OF CARE
Problem: Adult Inpatient Plan of Care  Goal: Plan of Care Review  Outcome: Met  Goal: Patient-Specific Goal (Individualized)  Outcome: Met  Goal: Absence of Hospital-Acquired Illness or Injury  Outcome: Met  Goal: Optimal Comfort and Wellbeing  Outcome: Met  Goal: Readiness for Transition of Care  Outcome: Met     Problem: Diabetes Comorbidity  Goal: Blood Glucose Level Within Targeted Range  Outcome: Met     Problem: Fall Injury Risk  Goal: Absence of Fall and Fall-Related Injury  Outcome: Met     Problem: Skin Injury Risk Increased  Goal: Skin Health and Integrity  Outcome: Met     Problem: Gas Exchange Impaired  Goal: Optimal Gas Exchange  Outcome: Met     Problem: Fatigue  Goal: Improved Activity Tolerance  Outcome: Met     Problem: Heart Failure  Goal: Optimal Coping  Outcome: Met  Goal: Optimal Cardiac Output  Outcome: Met  Goal: Stable Heart Rate and Rhythm  Outcome: Met  Goal: Optimal Functional Ability  Outcome: Met  Goal: Fluid and Electrolyte Balance  Outcome: Met  Goal: Improved Oral Intake  Outcome: Met  Goal: Effective Oxygenation and Ventilation  Outcome: Met  Goal: Effective Breathing Pattern During Sleep  Outcome: Met

## 2025-01-28 LAB
CHOLEST FLD-MCNC: 21 MG/DL
FINAL PATHOLOGIC DIAGNOSIS: NORMAL
Lab: NORMAL
MICROSCOPIC EXAM: NORMAL

## 2025-01-28 NOTE — DISCHARGE SUMMARY
[facility] Medicine  Discharge Summary      Patient Name: Hima Leija  MRN: [id number]  [facility]: [id number]  Patient Class: IP- Inpatient  Admission Date: 1/25/2025  Hospital Length of Stay: 2 days  Discharge Date and Time: 1/27/2025 11:13 AM  Attending Physician:Dr. Yanez  Discharging Provider: Divina Taylor NP  Primary Care Provider: Darci Rooney PA    Primary Care Team: Networked reference to record PCT     HPI:   Hima Leija is a 89 y.o. male with a PMH  has a past medical history of Bleeding from the nose, CHF (congestive heart failure), Coronary artery disease, Diabetes mellitus, Kiana (hard of hearing), Hyperlipidemia, Hypertension, Osteoarthritis, PAD (peripheral artery disease), Paroxysmal atrial fibrillation, and Wears dentures. who presented as a transfer from [facility] for higher level of care and further evaluation by pulmonology for bilateral pleural effusion resulting in dyspnea/shortness a breath both at rest and on exertion.  Patient recently discharged from outside facility following admission for treatment of pneumonia and was found to have new onset atrial fibrillation which was rate controlled at time of discharge on home metoprolol.  Since discharge, patient has had progressively worsening dyspnea/shortness of breath prompting patient to return to ED for further evaluation was found to have evidence of large bilateral pleural effusion with left greater than right upon initial workup.  At time of bedside assessment, patient is sitting up in bed in no acute distress complaining of dyspnea and bilateral lower extremity edema but reported all other review of systems negative in his without any additional concerns or complaints.  Pulmonology consulted and awaiting further evaluation/recommendations regarding therapeutic and diagnostic thoracentesis later today.  Patient currently saturating 100% on 4 L via nasal cannula.  Additional history of present illness  as noted below per  provider at time of transfer.    Patient is a 89-year-old male with a past medical history of hypertension, diabetes, atrial fibrillation and recent discharge after treatment for right-sided pneumonia that presents to the emergency department because of shortness of breath.  Patient satting 93% on room air.  Patient hypertensive and tachycardic.  Afebrile.  CT angio of the chest shows large bilateral pleural effusions, left greater than right.  Patchy airspace opacities in the right upper lobe.  No pulmonary emboli.  Patient given DuoNeb, azithromycin, Rocephin and Lasix.  Patient to be transferred to higher level of care for pulmonology consultation and possible thoracentesis.      PCP: Draci Rooney           [facility] Course:   The patient is a 89 yo male with CHF, CAD s/p CABG, HTN, HLD, PAD, DM type 2, Afib,a dn chronic respiratory failure admitted 1/25/25 with Large bilateral pleural effusions Left>Right and CHF exacerbation on IV Lasix. Echo showed EF 40% PASP 64 mmHg. Pulmonology and Cardiology consulted. Pt underwent left thoracentesis 1/25/25 draining 1 liter pleural fluid. Tolerated well. Fluid studies suggested transudate. Cardiology recommended increase Lasix to 40 mg bid IV and d/c HCTZ and increase lopressor to 100 mg bid, continue amlodipine and losartan. On 1/26/25, the pt underwent right thoracentesis draining 950ml pleural fluid. The patient tolerated procedures well. SOB improved. IV Lasix transitioned to Lasix 40mg oral daily. Pt will f/u with Pulmonology and Cardiology.  The patient was educated on 2gm sodium diet with 1500ml fluid restriction. Ambulatory pulse ox done and pt continued to require home oxygen (which he already had). The patient was seen and examined today and determined to be stable for discharge.           Goals of Care Treatment Preferences:  Code Status: Full Code      SDOH Screening:  The patient was screened for utility difficulties, food  insecurity, transport difficulties, housing insecurity, and interpersonal safety and there were no concerns identified this admission.     Consults:   Consults (From admission, onward)          Status Ordering Provider     Inpatient consult to Pulmonology  Once        Provider:  Talat Koo MD    Completed DIA RAMOS.     Inpatient consult to Cardiology  Once        Provider:  Tee Redd MD    Completed DIA RAMOS.     Inpatient consult to Social Work/Case Management  Once        Provider:  (Not yet assigned)    Completed DIA RAMOS.     Inpatient consult to Registered Dietitian/Nutritionist  Once        Provider:  (Not yet assigned)    Completed DIA RAMOS          Final Active Diagnoses:    Diagnosis Date Noted POA    PRINCIPAL PROBLEM:  Bilateral pleural effusion [J90] 01/25/2025 Yes    Acute on chronic diastolic congestive heart failure [I50.33] 01/25/2025 Yes    Atrial fibrillation [I48.91] 01/25/2025 Yes     Chronic    Pulmonary hypertension assoc with unclear multi-factorial mechanisms [I27.29] 01/26/2025 Yes    HLD (hyperlipidemia) [E78.5] 01/25/2025 Yes     Chronic    CAD (coronary artery disease) [I25.10] 01/25/2025 Yes     Chronic    Opacity of lung on imaging study [R91.8] 01/25/2025 Yes    Chronic respiratory failure with hypoxia [J96.11] 01/25/2025 Yes    Neuropathy [G62.9] 08/23/2022 Yes     Chronic    Hypertension [I10] 06/06/2019 Yes     Chronic    Type 2 diabetes mellitus, without long-term current use of insulin [E11.9] 06/06/2019 Yes     Chronic      Problems Resolved During this Admission:       Discharged Condition: stable    Disposition: Home or Self Care    Follow Up:   Follow-up Information       Darci Rooney PA Follow up in 3 day(s).    Specialties: General Practice, Family Medicine  Contact information:  [address] [place] [address]  3RD FLOOR  [facility]  [place] [id number] [phone]               [facility] CARDIOLOGY Follow up in 1 week(s).     Specialty: Cardiology  Contact information:  [address] [facility] [address]  [phone]             [facility] Follow up in 1 week(s).    Specialty: Pulmonology  Contact information:  [address] [facility] [address]  [phone]             Otolaryngology Follow up in 1 week(s).    Specialty: Otolaryngology  Contact information:  [address] [facility] [address]  [phone]                         Patient Instructions:      Ambulatory referral/consult to Cardiology   Standing Status: Future   Referral Priority: Routine Referral Type: Consultation   Referral Reason: Specialty Services Required   Requested Specialty: Cardiology   Number of Visits Requested: 1     Ambulatory referral/consult to Pulmonology   Standing Status: Future   Referral Priority: Routine Referral Type: Consultation   Referral Reason: Specialty Services Required   Requested Specialty: Pulmonary Disease   Number of Visits Requested: 1     Ambulatory referral/consult to ENT   Standing Status: Future   Referral Priority: Routine Referral Type: Consultation   Referral Reason: Specialty Services Required   Requested Specialty: Otolaryngology   Number of Visits Requested: 1     Diet diabetic     Activity as tolerated       Significant Diagnostic Studies:      Pending Diagnostic Studies:       Procedure Component Value Units Date/Time    Cholesterol, Body Fluid (Reference Lab) Pleural Fluid, Left [[id number]] Collected: 01/25/25 1346    Order Status: Sent Lab Status: In process Updated: 01/25/25 1849    Specimen: Body Fluid     Cytology, Pulmonary [[id number]] Collected: 01/26/25 0948    Order Status: Sent Lab Status: In process Updated: 01/27/25 0855    Cytology, Pulmonary [[id number]] Collected: 01/25/25 1346    Order Status: Sent Lab Status: In process Updated: 01/25/25 1458           Medications:  Reconciled Home Medications:      Medication List         START taking these medications      aspirin 81 MG EC tablet  Commonly known as: ECOTRIN  Take 1 tablet (81 mg total) by mouth once daily.     furosemide 40 MG tablet  Commonly known as: LASIX  Take 1 tablet (40 mg total) by mouth once daily.     potassium chloride SA 10 MEQ tablet  Commonly known as: K-DUR,KLOR-CON M  Take 1 tablet (10 mEq total) by mouth once daily.            CHANGE how you take these medications      metoprolol tartrate 100 MG tablet  Commonly known as: LOPRESSOR  Take 1 tablet (100 mg total) by mouth 2 (two) times daily.  What changed:   medication strength  how much to take            CONTINUE taking these medications      albuterol-ipratropium 2.5 mg-0.5 mg/3 mL nebulizer solution  Commonly known as: DUO-NEB  Take 3 mLs by nebulization every 6 (six) hours while awake. Rescue     amLODIPine 10 MG tablet  Commonly known as: NORVASC  Take 1 tablet (10 mg total) by mouth every evening.     atorvastatin 40 MG tablet  Commonly known as: LIPITOR  Take 40 mg by mouth once daily.     cyanocobalamin 1000 MCG tablet  Commonly known as: VITAMIN B-12  Take 1,000 mcg by mouth once daily.     gabapentin 100 MG capsule  Commonly known as: NEURONTIN  Take 2 capsules (200 mg total) by mouth 2 (two) times a day.     irbesartan 300 MG tablet  Commonly known as: AVAPRO  Take 300 mg by mouth once daily.     metFORMIN 750 MG ER 24hr tablet  Commonly known as: GLUCOPHAGE-XR  Take 500 mg by mouth daily with breakfast.     multivitamin capsule  Take 1 capsule by mouth once daily.     nitroGLYCERIN 0.4 MG SL tablet  Commonly known as: NITROSTAT  Place 0.4 mg under the tongue every 5 (five) minutes as needed for Chest pain.     RYBELSUS 7 mg tablet  Generic drug: semaglutide  Take 7 mg by mouth once daily.            STOP taking these medications      hydroCHLOROthiazide 25 MG tablet  Commonly known as: HYDRODIURIL              Indwelling Lines/Drains at time of discharge:   Lines/Drains/Airways       None                    Time spent on the discharge of patient: 45 minutes         Divina Taylor NP  Department of Hospital Medicine  '[place] - Telemetry ([facility])

## 2025-01-29 LAB
BACTERIA BLD CULT: NORMAL
BACTERIA BLD CULT: NORMAL
FINAL PATHOLOGIC DIAGNOSIS: NORMAL
Lab: NORMAL

## 2025-02-02 ENCOUNTER — NURSE TRIAGE (OUTPATIENT)
Dept: ADMINISTRATIVE | Facility: CLINIC | Age: OVER 89
End: 2025-02-02
Payer: MEDICARE

## 2025-02-02 NOTE — TELEPHONE ENCOUNTER
Has gained 8# and is swollen to waist on both sides. No SOB and O2 sat is 98. Patient is taking Lasix as prescribed and is urinating.    Reason for Disposition   Patient sounds very sick or weak to the triager    Additional Information   Negative: SEVERE difficulty breathing (e.g., struggling for each breath, speaks in single words)   Negative: Looks like a broken bone or dislocated joint (e.g., crooked or deformed)   Negative: Sounds like a life-threatening emergency to the triager   Negative: Chest pain   Negative: Followed a leg injury   Negative: [1] Followed an insect bite AND [2] localized swelling (e.g., small area of puffy or swollen skin)   Negative: Swelling of one ankle joint   Negative: Swelling of knee is main symptom   [1] Heart failure suspected (e.g., ankle swelling, shortness of breath, weight gain) AND [2] recently in hospital for heart failure   Negative: SEVERE difficulty breathing (e.g., struggling for each breath, speaks in single words, bluish lips)   Negative: Bluish (or gray) lips or face now   Negative: Difficult to awaken or acting confused (e.g., disoriented, slurred speech)   Negative: Slow, shallow and weak breathing   Negative: [1] Received SHOCK from implantable cardiac defibrillator AND [2] persisting symptoms (i.e., palpitations, lightheadedness)   Negative: Sounds like a life-threatening emergency to the triager   Negative: Chest pain   Negative: [1] Heart failure on treatment follow-up call AND [2] > 1 month since discharge   Negative: Oxygen level (e.g., pulse oximetry) 90% or lower   Negative: [1] SEVERE weakness (e.g., can't stand or can barely walk) AND [2] new-onset   Negative: MODERATE difficulty breathing (e.g., speaks in phrases, SOB even at rest, pulse 100-120)    Protocols used: Leg Swelling and Edema-A-, Heart Failure Post-Hospitalization Follow-up Call-A-

## 2025-02-03 PROBLEM — N17.9 AKI (ACUTE KIDNEY INJURY): Status: ACTIVE | Noted: 2025-02-03

## 2025-02-03 NOTE — TELEPHONE ENCOUNTER
That is okay, if he does not respond to the diuretics he may need fluid taken out again, thanks for the update

## 2025-02-24 LAB — FUNGUS SPEC CULT: NORMAL

## 2025-03-10 ENCOUNTER — RESULTS FOLLOW-UP (OUTPATIENT)
Dept: SLEEP MEDICINE | Facility: CLINIC | Age: OVER 89
End: 2025-03-10